# Patient Record
Sex: MALE | Race: BLACK OR AFRICAN AMERICAN | NOT HISPANIC OR LATINO
[De-identification: names, ages, dates, MRNs, and addresses within clinical notes are randomized per-mention and may not be internally consistent; named-entity substitution may affect disease eponyms.]

---

## 2017-01-10 ENCOUNTER — APPOINTMENT (OUTPATIENT)
Dept: OPHTHALMOLOGY | Facility: CLINIC | Age: 62
End: 2017-01-10

## 2017-03-06 ENCOUNTER — APPOINTMENT (OUTPATIENT)
Age: 62
End: 2017-03-06

## 2017-03-06 DIAGNOSIS — H10.31 UNSPECIFIED ACUTE CONJUNCTIVITIS, RIGHT EYE: ICD-10-CM

## 2017-03-06 RX ORDER — OFLOXACIN 3 MG/ML
0.3 SOLUTION/ DROPS OPHTHALMIC 4 TIMES DAILY
Qty: 1 | Refills: 1 | Status: ACTIVE | COMMUNITY
Start: 2017-03-06 | End: 1900-01-01

## 2017-03-07 ENCOUNTER — TRANSCRIPTION ENCOUNTER (OUTPATIENT)
Age: 62
End: 2017-03-07

## 2017-08-18 ENCOUNTER — APPOINTMENT (OUTPATIENT)
Dept: OPHTHALMOLOGY | Facility: CLINIC | Age: 62
End: 2017-08-18
Payer: COMMERCIAL

## 2017-08-18 PROCEDURE — 92014 COMPRE OPH EXAM EST PT 1/>: CPT

## 2017-08-18 PROCEDURE — ZZZZZ: CPT

## 2017-08-30 RX ORDER — PREDNISOLONE ACETATE 10 MG/ML
1 SUSPENSION/ DROPS OPHTHALMIC
Qty: 1 | Refills: 5 | Status: ACTIVE | COMMUNITY
Start: 2017-08-30 | End: 1900-01-01

## 2017-08-30 RX ORDER — OFLOXACIN 3 MG/ML
0.3 SOLUTION/ DROPS OPHTHALMIC 4 TIMES DAILY
Qty: 1 | Refills: 5 | Status: ACTIVE | COMMUNITY
Start: 2017-08-30 | End: 1900-01-01

## 2017-09-18 ENCOUNTER — TRANSCRIPTION ENCOUNTER (OUTPATIENT)
Age: 62
End: 2017-09-18

## 2017-09-18 ENCOUNTER — RESULT REVIEW (OUTPATIENT)
Age: 62
End: 2017-09-18

## 2017-09-18 ENCOUNTER — OUTPATIENT (OUTPATIENT)
Dept: OUTPATIENT SERVICES | Facility: HOSPITAL | Age: 62
LOS: 1 days | End: 2017-09-18
Payer: COMMERCIAL

## 2017-09-18 ENCOUNTER — APPOINTMENT (OUTPATIENT)
Dept: OPHTHALMOLOGY | Facility: HOSPITAL | Age: 62
End: 2017-09-18
Payer: COMMERCIAL

## 2017-09-18 VITALS
HEART RATE: 70 BPM | SYSTOLIC BLOOD PRESSURE: 136 MMHG | DIASTOLIC BLOOD PRESSURE: 87 MMHG | RESPIRATION RATE: 17 BRPM | OXYGEN SATURATION: 98 %

## 2017-09-18 VITALS
RESPIRATION RATE: 20 BRPM | OXYGEN SATURATION: 100 % | SYSTOLIC BLOOD PRESSURE: 134 MMHG | WEIGHT: 295.86 LBS | DIASTOLIC BLOOD PRESSURE: 89 MMHG | HEART RATE: 66 BPM | TEMPERATURE: 99 F | HEIGHT: 69.5 IN

## 2017-09-18 DIAGNOSIS — Z98.890 OTHER SPECIFIED POSTPROCEDURAL STATES: Chronic | ICD-10-CM

## 2017-09-18 DIAGNOSIS — H11.051 PERIPHERAL PTERYGIUM, PROGRESSIVE, RIGHT EYE: ICD-10-CM

## 2017-09-18 PROCEDURE — 65426 REMOVAL OF EYE LESION: CPT | Mod: RT

## 2017-09-18 PROCEDURE — 65426 REMOVAL OF EYE LESION: CPT

## 2017-09-18 PROCEDURE — C1889: CPT

## 2017-09-18 PROCEDURE — V2790: CPT

## 2017-09-18 PROCEDURE — 88304 TISSUE EXAM BY PATHOLOGIST: CPT | Mod: 26

## 2017-09-18 PROCEDURE — 88304 TISSUE EXAM BY PATHOLOGIST: CPT

## 2017-09-18 NOTE — ASU DISCHARGE PLAN (ADULT/PEDIATRIC). - NOTIFY
Fever greater than 101/Pain not relieved by Medications/Persistent Nausea and Vomiting/Bleeding that does not stop/Swelling that continues

## 2017-09-18 NOTE — ASU PATIENT PROFILE, ADULT - PMH
Diabetes    HTN (hypertension)    Hypercholesterolemia    Prostate cancer    Sleep apnea  uses CPAP machine at night

## 2017-09-18 NOTE — ASU DISCHARGE PLAN (ADULT/PEDIATRIC). - SPECIAL INSTRUCTIONS
Remove patch after 3 hours.  Throw away the white pad underneath the clear shield.  Start the following DURING THE DAY ONLY (not at night when sleeping):   Ofloxacin 1 drop every 4 hours,   Ketorolac 1 drop 2 times a day, and   Prednisolone acetate (shake well before using!) 1 drop every 2 hours.    Wear clear shield when napping or sleeping at night.  Do not rub eye as there are no stitches in the eye!  No showering tonight.    Please call [phone] if you have any questions or if you have pain or vomiting despite taking Tylenol (after 4:30 PM-9am).  Please call [phone] or [phone] during the day (9am-4:30PM) if you have any questions or concerns or pain or vomiting despite taking Tylenol.

## 2017-09-19 ENCOUNTER — APPOINTMENT (OUTPATIENT)
Dept: OPHTHALMOLOGY | Facility: CLINIC | Age: 62
End: 2017-09-19
Payer: COMMERCIAL

## 2017-09-19 PROCEDURE — 99024 POSTOP FOLLOW-UP VISIT: CPT

## 2017-09-26 ENCOUNTER — APPOINTMENT (OUTPATIENT)
Dept: OPHTHALMOLOGY | Facility: CLINIC | Age: 62
End: 2017-09-26
Payer: COMMERCIAL

## 2017-09-26 PROCEDURE — 99024 POSTOP FOLLOW-UP VISIT: CPT

## 2017-09-26 PROCEDURE — 92025 CPTRIZED CORNEAL TOPOGRAPHY: CPT

## 2017-10-17 PROBLEM — Z00.00 ENCOUNTER FOR PREVENTIVE HEALTH EXAMINATION: Noted: 2017-10-17

## 2017-10-24 ENCOUNTER — APPOINTMENT (OUTPATIENT)
Dept: OPHTHALMOLOGY | Facility: CLINIC | Age: 62
End: 2017-10-24

## 2018-01-16 ENCOUNTER — APPOINTMENT (OUTPATIENT)
Dept: OPHTHALMOLOGY | Facility: CLINIC | Age: 63
End: 2018-01-16
Payer: COMMERCIAL

## 2018-01-16 PROCEDURE — 92133 CPTRZD OPH DX IMG PST SGM ON: CPT

## 2018-01-16 PROCEDURE — 92012 INTRM OPH EXAM EST PATIENT: CPT

## 2018-01-16 PROCEDURE — 92025 CPTRIZED CORNEAL TOPOGRAPHY: CPT

## 2018-01-16 RX ORDER — PREDNISOLONE ACETATE 10 MG/ML
1 SUSPENSION/ DROPS OPHTHALMIC 4 TIMES DAILY
Qty: 1 | Refills: 5 | Status: ACTIVE | COMMUNITY
Start: 2018-01-16 | End: 1900-01-01

## 2018-01-31 ENCOUNTER — APPOINTMENT (OUTPATIENT)
Dept: OPHTHALMOLOGY | Facility: CLINIC | Age: 63
End: 2018-01-31
Payer: COMMERCIAL

## 2018-01-31 PROCEDURE — 92083 EXTENDED VISUAL FIELD XM: CPT

## 2018-01-31 PROCEDURE — 92012 INTRM OPH EXAM EST PATIENT: CPT

## 2018-03-30 ENCOUNTER — APPOINTMENT (OUTPATIENT)
Dept: OPHTHALMOLOGY | Facility: CLINIC | Age: 63
End: 2018-03-30
Payer: COMMERCIAL

## 2018-03-30 PROCEDURE — 92012 INTRM OPH EXAM EST PATIENT: CPT

## 2018-03-30 PROCEDURE — 92133 CPTRZD OPH DX IMG PST SGM ON: CPT

## 2018-05-25 ENCOUNTER — APPOINTMENT (OUTPATIENT)
Dept: OPHTHALMOLOGY | Facility: CLINIC | Age: 63
End: 2018-05-25
Payer: COMMERCIAL

## 2018-05-25 PROCEDURE — 92012 INTRM OPH EXAM EST PATIENT: CPT

## 2018-05-25 RX ORDER — FLUOROMETHOLONE 1 MG/ML
0.1 SOLUTION/ DROPS OPHTHALMIC 4 TIMES DAILY
Qty: 1 | Refills: 5 | Status: ACTIVE | COMMUNITY
Start: 2018-01-31 | End: 1900-01-01

## 2018-09-07 ENCOUNTER — APPOINTMENT (OUTPATIENT)
Dept: OPHTHALMOLOGY | Facility: CLINIC | Age: 63
End: 2018-09-07
Payer: COMMERCIAL

## 2018-09-07 PROCEDURE — 92133 CPTRZD OPH DX IMG PST SGM ON: CPT

## 2018-09-07 PROCEDURE — 92014 COMPRE OPH EXAM EST PT 1/>: CPT

## 2018-09-07 PROCEDURE — 92025 CPTRIZED CORNEAL TOPOGRAPHY: CPT

## 2018-09-07 RX ORDER — OLOPATADINE HCL 1 MG/ML
0.1 SOLUTION/ DROPS OPHTHALMIC TWICE DAILY
Qty: 1 | Refills: 3 | Status: ACTIVE | COMMUNITY
Start: 2018-09-07 | End: 1900-01-01

## 2019-03-05 ENCOUNTER — APPOINTMENT (OUTPATIENT)
Dept: OPHTHALMOLOGY | Facility: CLINIC | Age: 64
End: 2019-03-05
Payer: COMMERCIAL

## 2019-03-05 DIAGNOSIS — H11.001 UNSPECIFIED PTERYGIUM OF RIGHT EYE: ICD-10-CM

## 2019-03-05 DIAGNOSIS — H40.043 STEROID RESPONDER, BILATERAL: ICD-10-CM

## 2019-03-05 DIAGNOSIS — H25.13 AGE-RELATED NUCLEAR CATARACT, BILATERAL: ICD-10-CM

## 2019-03-05 PROCEDURE — 92136 OPHTHALMIC BIOMETRY: CPT

## 2019-03-05 PROCEDURE — 92012 INTRM OPH EXAM EST PATIENT: CPT

## 2019-03-05 PROCEDURE — 92133 CPTRZD OPH DX IMG PST SGM ON: CPT

## 2019-03-05 RX ORDER — FLUOROMETHOLONE 1 MG/ML
0.1 SOLUTION/ DROPS OPHTHALMIC TWICE DAILY
Qty: 1 | Refills: 5 | Status: ACTIVE | COMMUNITY
Start: 2019-03-05 | End: 1900-01-01

## 2019-03-05 RX ORDER — OLOPATADINE HYDROCHLORIDE 2 MG/ML
0.2 SOLUTION OPHTHALMIC DAILY
Qty: 1 | Refills: 5 | Status: ACTIVE | COMMUNITY
Start: 2019-03-05 | End: 1900-01-01

## 2019-09-09 ENCOUNTER — APPOINTMENT (OUTPATIENT)
Dept: OPHTHALMOLOGY | Facility: CLINIC | Age: 64
End: 2019-09-09
Payer: COMMERCIAL

## 2019-09-09 ENCOUNTER — NON-APPOINTMENT (OUTPATIENT)
Age: 64
End: 2019-09-09

## 2019-09-09 PROCEDURE — 92012 INTRM OPH EXAM EST PATIENT: CPT

## 2019-09-10 ENCOUNTER — APPOINTMENT (OUTPATIENT)
Dept: OPHTHALMOLOGY | Facility: CLINIC | Age: 64
End: 2019-09-10

## 2019-11-02 ENCOUNTER — EMERGENCY (EMERGENCY)
Facility: HOSPITAL | Age: 64
LOS: 0 days | Discharge: HOME | End: 2019-11-02
Payer: COMMERCIAL

## 2019-11-02 VITALS
HEART RATE: 93 BPM | SYSTOLIC BLOOD PRESSURE: 161 MMHG | WEIGHT: 250 LBS | TEMPERATURE: 99 F | OXYGEN SATURATION: 96 % | HEIGHT: 69 IN | RESPIRATION RATE: 16 BRPM | DIASTOLIC BLOOD PRESSURE: 92 MMHG

## 2019-11-02 DIAGNOSIS — Z99.89 DEPENDENCE ON OTHER ENABLING MACHINES AND DEVICES: ICD-10-CM

## 2019-11-02 DIAGNOSIS — Z79.01 LONG TERM (CURRENT) USE OF ANTICOAGULANTS: ICD-10-CM

## 2019-11-02 DIAGNOSIS — K08.89 OTHER SPECIFIED DISORDERS OF TEETH AND SUPPORTING STRUCTURES: ICD-10-CM

## 2019-11-02 DIAGNOSIS — Z79.84 LONG TERM (CURRENT) USE OF ORAL HYPOGLYCEMIC DRUGS: ICD-10-CM

## 2019-11-02 DIAGNOSIS — Z98.890 OTHER SPECIFIED POSTPROCEDURAL STATES: Chronic | ICD-10-CM

## 2019-11-02 DIAGNOSIS — E78.00 PURE HYPERCHOLESTEROLEMIA, UNSPECIFIED: ICD-10-CM

## 2019-11-02 DIAGNOSIS — Z85.46 PERSONAL HISTORY OF MALIGNANT NEOPLASM OF PROSTATE: ICD-10-CM

## 2019-11-02 DIAGNOSIS — G47.30 SLEEP APNEA, UNSPECIFIED: ICD-10-CM

## 2019-11-02 DIAGNOSIS — I10 ESSENTIAL (PRIMARY) HYPERTENSION: ICD-10-CM

## 2019-11-02 DIAGNOSIS — E11.9 TYPE 2 DIABETES MELLITUS WITHOUT COMPLICATIONS: ICD-10-CM

## 2019-11-02 PROCEDURE — 99283 EMERGENCY DEPT VISIT LOW MDM: CPT

## 2019-11-02 RX ORDER — ACETAMINOPHEN 500 MG
2 TABLET ORAL
Qty: 24 | Refills: 0
Start: 2019-11-02 | End: 2019-11-04

## 2019-11-02 RX ORDER — AMOXICILLIN 250 MG/5ML
1 SUSPENSION, RECONSTITUTED, ORAL (ML) ORAL
Qty: 21 | Refills: 0
Start: 2019-11-02 | End: 2019-11-08

## 2019-11-02 RX ORDER — IBUPROFEN 200 MG
1 TABLET ORAL
Qty: 6 | Refills: 0
Start: 2019-11-02 | End: 2019-11-03

## 2019-11-02 RX ORDER — ACETAMINOPHEN 500 MG
975 TABLET ORAL ONCE
Refills: 0 | Status: COMPLETED | OUTPATIENT
Start: 2019-11-02 | End: 2019-11-02

## 2019-11-02 RX ADMIN — Medication 975 MILLIGRAM(S): at 20:41

## 2019-11-02 RX ADMIN — Medication 975 MILLIGRAM(S): at 20:54

## 2019-11-02 RX ADMIN — Medication 1 TABLET(S): at 20:41

## 2019-11-02 NOTE — ED PROVIDER NOTE - OBJECTIVE STATEMENT
63 y/o M with HTN, CAD (denies being on any blood thinners or ASA now, reports his Dr stopped a while back), c/o R upper tooth pain x 3 days. Pt reports that his dentist had said there was a gap on the tooht, but no cavities at the time. pt denies F/C, CP, SOB, dizziness, trouble swallowing. 63 y/o M with HTN, CAD (denies being on any blood thinners or ASA now, reports his Dr stopped a while back), c/o R upper tooth pain x 3 days. Pt reports that his dentist had said there was a "gap" on the tooth, but no cavities at the time. Pt denies F/C, CP, SOB, dizziness, trouble swallowing.

## 2019-11-02 NOTE — ED PROVIDER NOTE - PATIENT PORTAL LINK FT
You can access the FollowMyHealth Patient Portal offered by [facility] by registering at the following website: [email or web address]. By joining [facility]’s FollowMyHealth portal, you will also be able to view your health information using other applications (apps) compatible with our system.

## 2019-11-02 NOTE — ED PROVIDER NOTE - PHYSICAL EXAMINATION
R premolar, subluxed? tooth yellowish. +mild TTP to gingival over the tooth. no abscess, fluctuanca R premolar, subluxed? tooth yellowish. +mild TTP to gingival over the tooth. no swelling/abscess,  no fluctuance. Uvula is midline. talks in full sentences, no resp distress.

## 2019-11-02 NOTE — ED PROVIDER NOTE - CLINICAL SUMMARY MEDICAL DECISION MAKING FREE TEXT BOX
63 y/o M c/o tooth pain. +TTP, no overt abscess. abx, f/u with Dental in 1-2 days. RTER if feel worse or have new symptoms.

## 2020-03-06 ENCOUNTER — APPOINTMENT (OUTPATIENT)
Dept: OPHTHALMOLOGY | Facility: CLINIC | Age: 65
End: 2020-03-06

## 2020-03-09 ENCOUNTER — APPOINTMENT (OUTPATIENT)
Dept: OPHTHALMOLOGY | Facility: CLINIC | Age: 65
End: 2020-03-09

## 2020-09-08 NOTE — ED ADULT NURSE NOTE - CAS EDN INTEG ASSESS
· Suspect hypovolemic hyponatremia in the setting of infection  · Treated with IV fluids  · Repeat BMP in a m  WDL

## 2020-09-10 ENCOUNTER — APPOINTMENT (OUTPATIENT)
Dept: OPHTHALMOLOGY | Facility: CLINIC | Age: 65
End: 2020-09-10
Payer: COMMERCIAL

## 2020-09-10 ENCOUNTER — NON-APPOINTMENT (OUTPATIENT)
Age: 65
End: 2020-09-10

## 2020-09-10 PROCEDURE — 92014 COMPRE OPH EXAM EST PT 1/>: CPT

## 2020-09-10 PROCEDURE — 92134 CPTRZ OPH DX IMG PST SGM RTA: CPT

## 2020-12-09 ENCOUNTER — APPOINTMENT (OUTPATIENT)
Dept: OPHTHALMOLOGY | Facility: CLINIC | Age: 65
End: 2020-12-09
Payer: COMMERCIAL

## 2020-12-09 ENCOUNTER — NON-APPOINTMENT (OUTPATIENT)
Age: 65
End: 2020-12-09

## 2020-12-09 PROCEDURE — 92136 OPHTHALMIC BIOMETRY: CPT

## 2020-12-09 PROCEDURE — 99072 ADDL SUPL MATRL&STAF TM PHE: CPT

## 2020-12-09 PROCEDURE — 92012 INTRM OPH EXAM EST PATIENT: CPT

## 2020-12-09 PROCEDURE — 92025 CPTRIZED CORNEAL TOPOGRAPHY: CPT

## 2021-02-10 ENCOUNTER — APPOINTMENT (OUTPATIENT)
Dept: OPHTHALMOLOGY | Facility: CLINIC | Age: 66
End: 2021-02-10

## 2021-03-11 ENCOUNTER — APPOINTMENT (OUTPATIENT)
Dept: OPHTHALMOLOGY | Facility: CLINIC | Age: 66
End: 2021-03-11
Payer: COMMERCIAL

## 2021-03-11 ENCOUNTER — NON-APPOINTMENT (OUTPATIENT)
Age: 66
End: 2021-03-11

## 2021-03-11 PROCEDURE — 92012 INTRM OPH EXAM EST PATIENT: CPT

## 2021-05-10 ENCOUNTER — TRANSCRIPTION ENCOUNTER (OUTPATIENT)
Age: 66
End: 2021-05-10

## 2021-05-11 ENCOUNTER — APPOINTMENT (OUTPATIENT)
Dept: OPHTHALMOLOGY | Facility: AMBULATORY SURGERY CENTER | Age: 66
End: 2021-05-11
Payer: COMMERCIAL

## 2021-05-11 PROCEDURE — 66984 XCAPSL CTRC RMVL W/O ECP: CPT | Mod: RT

## 2021-05-12 ENCOUNTER — NON-APPOINTMENT (OUTPATIENT)
Age: 66
End: 2021-05-12

## 2021-05-12 ENCOUNTER — APPOINTMENT (OUTPATIENT)
Dept: OPHTHALMOLOGY | Facility: CLINIC | Age: 66
End: 2021-05-12
Payer: COMMERCIAL

## 2021-05-12 PROCEDURE — 99024 POSTOP FOLLOW-UP VISIT: CPT

## 2021-05-19 ENCOUNTER — APPOINTMENT (OUTPATIENT)
Dept: OPHTHALMOLOGY | Facility: CLINIC | Age: 66
End: 2021-05-19
Payer: COMMERCIAL

## 2021-05-19 ENCOUNTER — NON-APPOINTMENT (OUTPATIENT)
Age: 66
End: 2021-05-19

## 2021-05-19 PROCEDURE — 99024 POSTOP FOLLOW-UP VISIT: CPT

## 2021-06-11 ENCOUNTER — APPOINTMENT (OUTPATIENT)
Dept: OPHTHALMOLOGY | Facility: CLINIC | Age: 66
End: 2021-06-11

## 2021-06-21 ENCOUNTER — NON-APPOINTMENT (OUTPATIENT)
Age: 66
End: 2021-06-21

## 2021-06-21 ENCOUNTER — APPOINTMENT (OUTPATIENT)
Dept: OPHTHALMOLOGY | Facility: CLINIC | Age: 66
End: 2021-06-21
Payer: COMMERCIAL

## 2021-06-21 PROCEDURE — 92250 FUNDUS PHOTOGRAPHY W/I&R: CPT

## 2021-06-21 PROCEDURE — 99024 POSTOP FOLLOW-UP VISIT: CPT

## 2021-10-25 ENCOUNTER — APPOINTMENT (OUTPATIENT)
Dept: OPHTHALMOLOGY | Facility: CLINIC | Age: 66
End: 2021-10-25

## 2021-11-03 ENCOUNTER — EMERGENCY (EMERGENCY)
Facility: HOSPITAL | Age: 66
LOS: 0 days | Discharge: HOME | End: 2021-11-04
Attending: STUDENT IN AN ORGANIZED HEALTH CARE EDUCATION/TRAINING PROGRAM
Payer: COMMERCIAL

## 2021-11-03 VITALS
TEMPERATURE: 98 F | HEIGHT: 69 IN | OXYGEN SATURATION: 98 % | DIASTOLIC BLOOD PRESSURE: 100 MMHG | WEIGHT: 248.02 LBS | HEART RATE: 83 BPM | RESPIRATION RATE: 18 BRPM | SYSTOLIC BLOOD PRESSURE: 170 MMHG

## 2021-11-03 DIAGNOSIS — R10.2 PELVIC AND PERINEAL PAIN: ICD-10-CM

## 2021-11-03 DIAGNOSIS — R10.30 LOWER ABDOMINAL PAIN, UNSPECIFIED: ICD-10-CM

## 2021-11-03 DIAGNOSIS — M79.18 MYALGIA, OTHER SITE: ICD-10-CM

## 2021-11-03 DIAGNOSIS — Z98.890 OTHER SPECIFIED POSTPROCEDURAL STATES: Chronic | ICD-10-CM

## 2021-11-03 DIAGNOSIS — Z79.84 LONG TERM (CURRENT) USE OF ORAL HYPOGLYCEMIC DRUGS: ICD-10-CM

## 2021-11-03 DIAGNOSIS — Z98.890 OTHER SPECIFIED POSTPROCEDURAL STATES: ICD-10-CM

## 2021-11-03 DIAGNOSIS — R25.2 CRAMP AND SPASM: ICD-10-CM

## 2021-11-03 DIAGNOSIS — E11.9 TYPE 2 DIABETES MELLITUS WITHOUT COMPLICATIONS: ICD-10-CM

## 2021-11-03 DIAGNOSIS — Z85.46 PERSONAL HISTORY OF MALIGNANT NEOPLASM OF PROSTATE: ICD-10-CM

## 2021-11-03 DIAGNOSIS — I10 ESSENTIAL (PRIMARY) HYPERTENSION: ICD-10-CM

## 2021-11-03 LAB
ALBUMIN SERPL ELPH-MCNC: 3.2 G/DL — LOW (ref 3.3–5)
ALP SERPL-CCNC: 94 U/L — SIGNIFICANT CHANGE UP (ref 40–120)
ALT FLD-CCNC: 17 U/L — SIGNIFICANT CHANGE UP (ref 12–78)
ANION GAP SERPL CALC-SCNC: 6 MMOL/L — SIGNIFICANT CHANGE UP (ref 5–17)
APPEARANCE UR: CLEAR — SIGNIFICANT CHANGE UP
AST SERPL-CCNC: 13 U/L — LOW (ref 15–37)
BASOPHILS # BLD AUTO: 0.06 K/UL — SIGNIFICANT CHANGE UP (ref 0–0.2)
BASOPHILS NFR BLD AUTO: 1 % — SIGNIFICANT CHANGE UP (ref 0–2)
BILIRUB SERPL-MCNC: 0.4 MG/DL — SIGNIFICANT CHANGE UP (ref 0.2–1.2)
BILIRUB UR-MCNC: NEGATIVE — SIGNIFICANT CHANGE UP
BLD GP AB SCN SERPL QL: SIGNIFICANT CHANGE UP
BUN SERPL-MCNC: 17 MG/DL — SIGNIFICANT CHANGE UP (ref 7–23)
CALCIUM SERPL-MCNC: 9.5 MG/DL — SIGNIFICANT CHANGE UP (ref 8.5–10.1)
CHLORIDE SERPL-SCNC: 104 MMOL/L — SIGNIFICANT CHANGE UP (ref 96–108)
CO2 SERPL-SCNC: 26 MMOL/L — SIGNIFICANT CHANGE UP (ref 22–31)
COLOR SPEC: YELLOW — SIGNIFICANT CHANGE UP
CREAT SERPL-MCNC: 0.91 MG/DL — SIGNIFICANT CHANGE UP (ref 0.5–1.3)
DIFF PNL FLD: NEGATIVE — SIGNIFICANT CHANGE UP
EOSINOPHIL # BLD AUTO: 0.13 K/UL — SIGNIFICANT CHANGE UP (ref 0–0.5)
EOSINOPHIL NFR BLD AUTO: 2.1 % — SIGNIFICANT CHANGE UP (ref 0–6)
GLUCOSE SERPL-MCNC: 350 MG/DL — HIGH (ref 70–99)
GLUCOSE UR QL: 1000 MG/DL
HCT VFR BLD CALC: 40.2 % — SIGNIFICANT CHANGE UP (ref 39–50)
HGB BLD-MCNC: 13.4 G/DL — SIGNIFICANT CHANGE UP (ref 13–17)
IMM GRANULOCYTES NFR BLD AUTO: 0.2 % — SIGNIFICANT CHANGE UP (ref 0–1.5)
KETONES UR-MCNC: ABNORMAL
LACTATE SERPL-SCNC: 1.3 MMOL/L — SIGNIFICANT CHANGE UP (ref 0.7–2)
LEUKOCYTE ESTERASE UR-ACNC: NEGATIVE — SIGNIFICANT CHANGE UP
LIDOCAIN IGE QN: 131 U/L — SIGNIFICANT CHANGE UP (ref 73–393)
LYMPHOCYTES # BLD AUTO: 2.84 K/UL — SIGNIFICANT CHANGE UP (ref 1–3.3)
LYMPHOCYTES # BLD AUTO: 45.7 % — HIGH (ref 13–44)
MCHC RBC-ENTMCNC: 30.6 PG — SIGNIFICANT CHANGE UP (ref 27–34)
MCHC RBC-ENTMCNC: 33.3 GM/DL — SIGNIFICANT CHANGE UP (ref 32–36)
MCV RBC AUTO: 91.8 FL — SIGNIFICANT CHANGE UP (ref 80–100)
MONOCYTES # BLD AUTO: 0.53 K/UL — SIGNIFICANT CHANGE UP (ref 0–0.9)
MONOCYTES NFR BLD AUTO: 8.5 % — SIGNIFICANT CHANGE UP (ref 2–14)
NEUTROPHILS # BLD AUTO: 2.64 K/UL — SIGNIFICANT CHANGE UP (ref 1.8–7.4)
NEUTROPHILS NFR BLD AUTO: 42.5 % — LOW (ref 43–77)
NITRITE UR-MCNC: NEGATIVE — SIGNIFICANT CHANGE UP
NRBC # BLD: 0 /100 WBCS — SIGNIFICANT CHANGE UP (ref 0–0)
PH UR: 6 — SIGNIFICANT CHANGE UP (ref 5–8)
PLATELET # BLD AUTO: 278 K/UL — SIGNIFICANT CHANGE UP (ref 150–400)
POTASSIUM SERPL-MCNC: 4.2 MMOL/L — SIGNIFICANT CHANGE UP (ref 3.5–5.3)
POTASSIUM SERPL-SCNC: 4.2 MMOL/L — SIGNIFICANT CHANGE UP (ref 3.5–5.3)
PROT SERPL-MCNC: 7.1 GM/DL — SIGNIFICANT CHANGE UP (ref 6–8.3)
PROT UR-MCNC: NEGATIVE MG/DL — SIGNIFICANT CHANGE UP
RBC # BLD: 4.38 M/UL — SIGNIFICANT CHANGE UP (ref 4.2–5.8)
RBC # FLD: 12.7 % — SIGNIFICANT CHANGE UP (ref 10.3–14.5)
RBC CASTS # UR COMP ASSIST: SIGNIFICANT CHANGE UP /HPF (ref 0–4)
SODIUM SERPL-SCNC: 136 MMOL/L — SIGNIFICANT CHANGE UP (ref 135–145)
SP GR SPEC: 1.01 — SIGNIFICANT CHANGE UP (ref 1.01–1.02)
UROBILINOGEN FLD QL: NEGATIVE MG/DL — SIGNIFICANT CHANGE UP
WBC # BLD: 6.21 K/UL — SIGNIFICANT CHANGE UP (ref 3.8–10.5)
WBC # FLD AUTO: 6.21 K/UL — SIGNIFICANT CHANGE UP (ref 3.8–10.5)

## 2021-11-03 PROCEDURE — 99285 EMERGENCY DEPT VISIT HI MDM: CPT

## 2021-11-03 PROCEDURE — 74177 CT ABD & PELVIS W/CONTRAST: CPT | Mod: 26,MA

## 2021-11-03 PROCEDURE — 93970 EXTREMITY STUDY: CPT | Mod: 26

## 2021-11-03 RX ORDER — SODIUM CHLORIDE 9 MG/ML
1000 INJECTION INTRAMUSCULAR; INTRAVENOUS; SUBCUTANEOUS ONCE
Refills: 0 | Status: COMPLETED | OUTPATIENT
Start: 2021-11-03 | End: 2021-11-03

## 2021-11-03 RX ORDER — IOHEXOL 300 MG/ML
30 INJECTION, SOLUTION INTRAVENOUS ONCE
Refills: 0 | Status: COMPLETED | OUTPATIENT
Start: 2021-11-03 | End: 2021-11-03

## 2021-11-03 RX ORDER — ACETAMINOPHEN 500 MG
975 TABLET ORAL ONCE
Refills: 0 | Status: COMPLETED | OUTPATIENT
Start: 2021-11-03 | End: 2021-11-03

## 2021-11-03 RX ORDER — ACETAMINOPHEN 500 MG
650 TABLET ORAL ONCE
Refills: 0 | Status: COMPLETED | OUTPATIENT
Start: 2021-11-03 | End: 2021-11-03

## 2021-11-03 RX ORDER — KETOROLAC TROMETHAMINE 30 MG/ML
15 SYRINGE (ML) INJECTION ONCE
Refills: 0 | Status: DISCONTINUED | OUTPATIENT
Start: 2021-11-03 | End: 2021-11-03

## 2021-11-03 RX ADMIN — Medication 975 MILLIGRAM(S): at 20:29

## 2021-11-03 RX ADMIN — SODIUM CHLORIDE 1000 MILLILITER(S): 9 INJECTION INTRAMUSCULAR; INTRAVENOUS; SUBCUTANEOUS at 20:29

## 2021-11-03 RX ADMIN — IOHEXOL 30 MILLILITER(S): 300 INJECTION, SOLUTION INTRAVENOUS at 20:29

## 2021-11-03 RX ADMIN — Medication 15 MILLIGRAM(S): at 23:02

## 2021-11-03 RX ADMIN — Medication 15 MILLIGRAM(S): at 23:17

## 2021-11-03 RX ADMIN — Medication 975 MILLIGRAM(S): at 21:29

## 2021-11-03 NOTE — ED PROVIDER NOTE - OBJECTIVE STATEMENT
65 yo M w/PMH of DM, HTN, prostate cancer, stents presents to the ED for lower abdominal pain, b/l flank pain and pelvic pain x2 months. Pt presented to PMD x2 weeks ago who advised pt to stop taking his statin as his pain might be related to his cholesterol and told to present to ED for evaluation is pain persisted. Pt also c/o b/l leg cramping. Denies fever/chills, cough, SOB, CP, abdominal pain, leg swelling, hematuria, dysuria, difficulty urinating or N/V/D. This if pt first hospital visit for this issue. Pt went to cardiologist with negative workup x1 month ago, no imaging done.

## 2021-11-03 NOTE — ED PROVIDER NOTE - NS ED SCRIBE STATEMENT
Patient assisted back to bed via use of STEDY. Incontinent of small amount of stool. Has not yet voided since garrido catheter removed. Bladder scan shows 186mL. Attending

## 2021-11-03 NOTE — ED PROVIDER NOTE - PATIENT PORTAL LINK FT
You can access the FollowMyHealth Patient Portal offered by [facility] by registering at the following website: [email or web address]. By joining [facility]’s FollowMyHealth portal, you will also be able to view your health information using other applications (apps) compatible with our system.

## 2021-11-03 NOTE — ED ADULT NURSE NOTE - OBJECTIVE STATEMENT
Patient A& o x3 came in with complaints of pain in his stomach, sides, and bilateral thighs and back. Patient says the pain started getting worse this week. Last night the patient couldn't rest because of the pain. No N/V/D.  no chest pain, no sob. Pain has been going on for 4 months. Patient saw his PMD and said all tests were normal. Patient stopped taking atorvastatin 2 weeks ago. hx- htn, DM, stents

## 2021-11-03 NOTE — ED ADULT TRIAGE NOTE - HEIGHT IN CM
Patient awake this morning in bed. Patient in good spirits and reports to this nurse that he finally gets to go home today and asked what time he would be able to leave. I explained that we have to call [employer] and determine when transportation can be arranged. Patient says that he hopes to be able to leave soon. I set expectation that he will most likely be here till after lunch time. Medications and assessment completed. Call light and belongings within reach. [phone]

## 2021-11-03 NOTE — ED PROVIDER NOTE - NSICDXPASTMEDICALHX_GEN_ALL_CORE_FT
PAST MEDICAL HISTORY:  Diabetes     HTN (hypertension)     Hypercholesterolemia     Prostate cancer     Sleep apnea uses CPAP machine at night

## 2021-11-03 NOTE — ED ADULT NURSE NOTE - CAS ELECT INFOMATION PROVIDED
DC instructions Mucosal Advancement Flap Text: Given the location of the defect, shape of the defect and the proximity to free margins a mucosal advancement flap was deemed most appropriate. Incisions were made with a 15 blade scalpel in the appropriate fashion along the cutaneous vermilion border and the mucosal lip. The remaining actinically damaged mucosal tissue was excised.  The mucosal advancement flap was then elevated to the gingival sulcus with care taken to preserve the neurovascular structures and advanced into the primary defect. Care was taken to ensure that precise realignment of the vermilion border was achieved.

## 2021-11-03 NOTE — ED PROVIDER NOTE - CLINICAL SUMMARY MEDICAL DECISION MAKING FREE TEXT BOX
Pt with x1 month of abdominal pain. Given age and lack of recent workup will obtain CT scan and blood work. Will also r/o DVT given lower extremity cramping.

## 2021-11-03 NOTE — ED ADULT TRIAGE NOTE - CHIEF COMPLAINT QUOTE
c/o abdominal pain and b/l leg pain x 2 months seen at pmd for same c/o labs done no abnormal findings per pt atorvastatin d/c'd but pain persists

## 2021-11-04 VITALS
OXYGEN SATURATION: 97 % | HEART RATE: 77 BPM | SYSTOLIC BLOOD PRESSURE: 146 MMHG | DIASTOLIC BLOOD PRESSURE: 91 MMHG | RESPIRATION RATE: 18 BRPM | TEMPERATURE: 98 F

## 2021-11-05 LAB
CULTURE RESULTS: NO GROWTH — SIGNIFICANT CHANGE UP
SPECIMEN SOURCE: SIGNIFICANT CHANGE UP

## 2021-11-08 ENCOUNTER — APPOINTMENT (OUTPATIENT)
Dept: OPHTHALMOLOGY | Facility: CLINIC | Age: 66
End: 2021-11-08
Payer: COMMERCIAL

## 2021-11-08 ENCOUNTER — NON-APPOINTMENT (OUTPATIENT)
Age: 66
End: 2021-11-08

## 2021-11-08 PROCEDURE — 92250 FUNDUS PHOTOGRAPHY W/I&R: CPT

## 2021-11-08 PROCEDURE — 92012 INTRM OPH EXAM EST PATIENT: CPT

## 2021-12-31 ENCOUNTER — EMERGENCY (EMERGENCY)
Facility: HOSPITAL | Age: 66
LOS: 0 days | Discharge: ANOTHER HOSPITAL | End: 2021-12-31
Attending: EMERGENCY MEDICINE
Payer: COMMERCIAL

## 2021-12-31 ENCOUNTER — INPATIENT (INPATIENT)
Facility: HOSPITAL | Age: 66
LOS: 2 days | Discharge: HOME | DRG: 246 | End: 2022-01-03
Attending: HOSPITALIST | Admitting: INTERNAL MEDICINE
Payer: COMMERCIAL

## 2021-12-31 VITALS — DIASTOLIC BLOOD PRESSURE: 83 MMHG | HEART RATE: 80 BPM | SYSTOLIC BLOOD PRESSURE: 115 MMHG

## 2021-12-31 VITALS
SYSTOLIC BLOOD PRESSURE: 137 MMHG | HEART RATE: 99 BPM | OXYGEN SATURATION: 100 % | DIASTOLIC BLOOD PRESSURE: 90 MMHG | RESPIRATION RATE: 18 BRPM | TEMPERATURE: 98 F

## 2021-12-31 VITALS
HEART RATE: 102 BPM | SYSTOLIC BLOOD PRESSURE: 136 MMHG | HEIGHT: 69 IN | OXYGEN SATURATION: 100 % | WEIGHT: 237 LBS | DIASTOLIC BLOOD PRESSURE: 87 MMHG | TEMPERATURE: 98 F

## 2021-12-31 DIAGNOSIS — E11.9 TYPE 2 DIABETES MELLITUS WITHOUT COMPLICATIONS: ICD-10-CM

## 2021-12-31 DIAGNOSIS — E78.00 PURE HYPERCHOLESTEROLEMIA, UNSPECIFIED: ICD-10-CM

## 2021-12-31 DIAGNOSIS — C61 MALIGNANT NEOPLASM OF PROSTATE: ICD-10-CM

## 2021-12-31 DIAGNOSIS — I21.3 ST ELEVATION (STEMI) MYOCARDIAL INFARCTION OF UNSPECIFIED SITE: ICD-10-CM

## 2021-12-31 DIAGNOSIS — Z98.890 OTHER SPECIFIED POSTPROCEDURAL STATES: Chronic | ICD-10-CM

## 2021-12-31 DIAGNOSIS — I10 ESSENTIAL (PRIMARY) HYPERTENSION: ICD-10-CM

## 2021-12-31 DIAGNOSIS — R07.89 OTHER CHEST PAIN: ICD-10-CM

## 2021-12-31 DIAGNOSIS — Z79.84 LONG TERM (CURRENT) USE OF ORAL HYPOGLYCEMIC DRUGS: ICD-10-CM

## 2021-12-31 LAB
ALBUMIN SERPL ELPH-MCNC: 3.6 G/DL — SIGNIFICANT CHANGE UP (ref 3.3–5)
ALBUMIN SERPL ELPH-MCNC: 4.1 G/DL — SIGNIFICANT CHANGE UP (ref 3.3–5)
ALP SERPL-CCNC: 89 U/L — SIGNIFICANT CHANGE UP (ref 40–120)
ALP SERPL-CCNC: 98 U/L — SIGNIFICANT CHANGE UP (ref 40–120)
ALT FLD-CCNC: 11 U/L — SIGNIFICANT CHANGE UP (ref 10–45)
ALT FLD-CCNC: 18 U/L — SIGNIFICANT CHANGE UP (ref 12–78)
ANION GAP SERPL CALC-SCNC: 14 MMOL/L — SIGNIFICANT CHANGE UP (ref 5–17)
ANION GAP SERPL CALC-SCNC: 8 MMOL/L — SIGNIFICANT CHANGE UP (ref 5–17)
APTT BLD: 37 SEC — HIGH (ref 27.5–35.5)
APTT BLD: 49 SEC — HIGH (ref 27.5–35.5)
AST SERPL-CCNC: 16 U/L — SIGNIFICANT CHANGE UP (ref 15–37)
AST SERPL-CCNC: 32 U/L — SIGNIFICANT CHANGE UP (ref 10–40)
BASOPHILS # BLD AUTO: 0.03 K/UL — SIGNIFICANT CHANGE UP (ref 0–0.2)
BASOPHILS # BLD AUTO: 0.04 K/UL — SIGNIFICANT CHANGE UP (ref 0–0.2)
BASOPHILS NFR BLD AUTO: 0.4 % — SIGNIFICANT CHANGE UP (ref 0–2)
BASOPHILS NFR BLD AUTO: 0.5 % — SIGNIFICANT CHANGE UP (ref 0–2)
BILIRUB SERPL-MCNC: 0.4 MG/DL — SIGNIFICANT CHANGE UP (ref 0.2–1.2)
BILIRUB SERPL-MCNC: 0.7 MG/DL — SIGNIFICANT CHANGE UP (ref 0.2–1.2)
BLD GP AB SCN SERPL QL: NEGATIVE — SIGNIFICANT CHANGE UP
BUN SERPL-MCNC: 10 MG/DL — SIGNIFICANT CHANGE UP (ref 7–23)
BUN SERPL-MCNC: 13 MG/DL — SIGNIFICANT CHANGE UP (ref 7–23)
CALCIUM SERPL-MCNC: 9.2 MG/DL — SIGNIFICANT CHANGE UP (ref 8.5–10.1)
CALCIUM SERPL-MCNC: 9.5 MG/DL — SIGNIFICANT CHANGE UP (ref 8.4–10.5)
CHLORIDE SERPL-SCNC: 103 MMOL/L — SIGNIFICANT CHANGE UP (ref 96–108)
CHLORIDE SERPL-SCNC: 99 MMOL/L — SIGNIFICANT CHANGE UP (ref 96–108)
CK MB CFR SERPL CALC: 38.1 NG/ML — HIGH (ref 0–6.7)
CK SERPL-CCNC: 412 U/L — HIGH (ref 30–200)
CO2 SERPL-SCNC: 19 MMOL/L — LOW (ref 22–31)
CO2 SERPL-SCNC: 23 MMOL/L — SIGNIFICANT CHANGE UP (ref 22–31)
CREAT SERPL-MCNC: 0.58 MG/DL — SIGNIFICANT CHANGE UP (ref 0.5–1.3)
CREAT SERPL-MCNC: 1.02 MG/DL — SIGNIFICANT CHANGE UP (ref 0.5–1.3)
EOSINOPHIL # BLD AUTO: 0.01 K/UL — SIGNIFICANT CHANGE UP (ref 0–0.5)
EOSINOPHIL # BLD AUTO: 0.02 K/UL — SIGNIFICANT CHANGE UP (ref 0–0.5)
EOSINOPHIL NFR BLD AUTO: 0.1 % — SIGNIFICANT CHANGE UP (ref 0–6)
EOSINOPHIL NFR BLD AUTO: 0.3 % — SIGNIFICANT CHANGE UP (ref 0–6)
FLUAV AG NPH QL: SIGNIFICANT CHANGE UP
FLUBV AG NPH QL: SIGNIFICANT CHANGE UP
GLUCOSE BLDC GLUCOMTR-MCNC: 243 MG/DL — HIGH (ref 70–99)
GLUCOSE BLDC GLUCOMTR-MCNC: 305 MG/DL — HIGH (ref 70–99)
GLUCOSE SERPL-MCNC: 227 MG/DL — HIGH (ref 70–99)
GLUCOSE SERPL-MCNC: 290 MG/DL — HIGH (ref 70–99)
HCT VFR BLD CALC: 43.1 % — SIGNIFICANT CHANGE UP (ref 39–50)
HCT VFR BLD CALC: 46.8 % — SIGNIFICANT CHANGE UP (ref 39–50)
HGB BLD-MCNC: 14.3 G/DL — SIGNIFICANT CHANGE UP (ref 13–17)
HGB BLD-MCNC: 15.5 G/DL — SIGNIFICANT CHANGE UP (ref 13–17)
IMM GRANULOCYTES NFR BLD AUTO: 0.1 % — SIGNIFICANT CHANGE UP (ref 0–1.5)
IMM GRANULOCYTES NFR BLD AUTO: 0.1 % — SIGNIFICANT CHANGE UP (ref 0–1.5)
INR BLD: 1.02 RATIO — SIGNIFICANT CHANGE UP (ref 0.88–1.16)
INR BLD: 1.07 RATIO — SIGNIFICANT CHANGE UP (ref 0.88–1.16)
LACTATE SERPL-SCNC: 2.9 MMOL/L — HIGH (ref 0.7–2)
LYMPHOCYTES # BLD AUTO: 2.29 K/UL — SIGNIFICANT CHANGE UP (ref 1–3.3)
LYMPHOCYTES # BLD AUTO: 2.31 K/UL — SIGNIFICANT CHANGE UP (ref 1–3.3)
LYMPHOCYTES # BLD AUTO: 28.8 % — SIGNIFICANT CHANGE UP (ref 13–44)
LYMPHOCYTES # BLD AUTO: 30.5 % — SIGNIFICANT CHANGE UP (ref 13–44)
MAGNESIUM SERPL-MCNC: 1.9 MG/DL — SIGNIFICANT CHANGE UP (ref 1.6–2.6)
MAGNESIUM SERPL-MCNC: 2.1 MG/DL — SIGNIFICANT CHANGE UP (ref 1.6–2.6)
MCHC RBC-ENTMCNC: 30.2 PG — SIGNIFICANT CHANGE UP (ref 27–34)
MCHC RBC-ENTMCNC: 30.8 PG — SIGNIFICANT CHANGE UP (ref 27–34)
MCHC RBC-ENTMCNC: 33.1 GM/DL — SIGNIFICANT CHANGE UP (ref 32–36)
MCHC RBC-ENTMCNC: 33.2 GM/DL — SIGNIFICANT CHANGE UP (ref 32–36)
MCV RBC AUTO: 91.2 FL — SIGNIFICANT CHANGE UP (ref 80–100)
MCV RBC AUTO: 92.9 FL — SIGNIFICANT CHANGE UP (ref 80–100)
MONOCYTES # BLD AUTO: 0.56 K/UL — SIGNIFICANT CHANGE UP (ref 0–0.9)
MONOCYTES # BLD AUTO: 0.63 K/UL — SIGNIFICANT CHANGE UP (ref 0–0.9)
MONOCYTES NFR BLD AUTO: 7 % — SIGNIFICANT CHANGE UP (ref 2–14)
MONOCYTES NFR BLD AUTO: 8.3 % — SIGNIFICANT CHANGE UP (ref 2–14)
NEUTROPHILS # BLD AUTO: 4.57 K/UL — SIGNIFICANT CHANGE UP (ref 1.8–7.4)
NEUTROPHILS # BLD AUTO: 5.04 K/UL — SIGNIFICANT CHANGE UP (ref 1.8–7.4)
NEUTROPHILS NFR BLD AUTO: 60.5 % — SIGNIFICANT CHANGE UP (ref 43–77)
NEUTROPHILS NFR BLD AUTO: 63.4 % — SIGNIFICANT CHANGE UP (ref 43–77)
NRBC # BLD: 0 /100 WBCS — SIGNIFICANT CHANGE UP (ref 0–0)
NRBC # BLD: 0 /100 WBCS — SIGNIFICANT CHANGE UP (ref 0–0)
NT-PROBNP SERPL-SCNC: 114 PG/ML — SIGNIFICANT CHANGE UP (ref 0–125)
NT-PROBNP SERPL-SCNC: 258 PG/ML — SIGNIFICANT CHANGE UP (ref 0–300)
PHOSPHATE SERPL-MCNC: 3.1 MG/DL — SIGNIFICANT CHANGE UP (ref 2.5–4.5)
PLATELET # BLD AUTO: 315 K/UL — SIGNIFICANT CHANGE UP (ref 150–400)
PLATELET # BLD AUTO: 319 K/UL — SIGNIFICANT CHANGE UP (ref 150–400)
POTASSIUM SERPL-MCNC: 4.1 MMOL/L — SIGNIFICANT CHANGE UP (ref 3.5–5.3)
POTASSIUM SERPL-MCNC: 4.7 MMOL/L — SIGNIFICANT CHANGE UP (ref 3.5–5.3)
POTASSIUM SERPL-SCNC: 4.1 MMOL/L — SIGNIFICANT CHANGE UP (ref 3.5–5.3)
POTASSIUM SERPL-SCNC: 4.7 MMOL/L — SIGNIFICANT CHANGE UP (ref 3.5–5.3)
PROT SERPL-MCNC: 6.9 G/DL — SIGNIFICANT CHANGE UP (ref 6–8.3)
PROT SERPL-MCNC: 8 GM/DL — SIGNIFICANT CHANGE UP (ref 6–8.3)
PROTHROM AB SERPL-ACNC: 12.2 SEC — SIGNIFICANT CHANGE UP (ref 10.6–13.6)
PROTHROM AB SERPL-ACNC: 12.4 SEC — SIGNIFICANT CHANGE UP (ref 10.6–13.6)
RBC # BLD: 4.64 M/UL — SIGNIFICANT CHANGE UP (ref 4.2–5.8)
RBC # BLD: 5.13 M/UL — SIGNIFICANT CHANGE UP (ref 4.2–5.8)
RBC # FLD: 13 % — SIGNIFICANT CHANGE UP (ref 10.3–14.5)
RBC # FLD: 13.1 % — SIGNIFICANT CHANGE UP (ref 10.3–14.5)
RH IG SCN BLD-IMP: POSITIVE — SIGNIFICANT CHANGE UP
SARS-COV-2 RNA SPEC QL NAA+PROBE: SIGNIFICANT CHANGE UP
SODIUM SERPL-SCNC: 132 MMOL/L — LOW (ref 135–145)
SODIUM SERPL-SCNC: 134 MMOL/L — LOW (ref 135–145)
TROPONIN I, HIGH SENSITIVITY RESULT: 124.9 NG/L — HIGH
TROPONIN T, HIGH SENSITIVITY RESULT: 739 NG/L — HIGH (ref 0–51)
WBC # BLD: 7.57 K/UL — SIGNIFICANT CHANGE UP (ref 3.8–10.5)
WBC # BLD: 7.95 K/UL — SIGNIFICANT CHANGE UP (ref 3.8–10.5)
WBC # FLD AUTO: 7.57 K/UL — SIGNIFICANT CHANGE UP (ref 3.8–10.5)
WBC # FLD AUTO: 7.95 K/UL — SIGNIFICANT CHANGE UP (ref 3.8–10.5)

## 2021-12-31 PROCEDURE — 92941 PRQ TRLML REVSC TOT OCCL AMI: CPT | Mod: RC

## 2021-12-31 PROCEDURE — 92978 ENDOLUMINL IVUS OCT C 1ST: CPT | Mod: 26,RC

## 2021-12-31 PROCEDURE — 93454 CORONARY ARTERY ANGIO S&I: CPT | Mod: 26,59

## 2021-12-31 PROCEDURE — 99291 CRITICAL CARE FIRST HOUR: CPT | Mod: 25

## 2021-12-31 PROCEDURE — 93010 ELECTROCARDIOGRAM REPORT: CPT

## 2021-12-31 PROCEDURE — 99152 MOD SED SAME PHYS/QHP 5/>YRS: CPT

## 2021-12-31 RX ORDER — SOLIFENACIN SUCCINATE 10 MG/1
1 TABLET ORAL
Qty: 0 | Refills: 0 | DISCHARGE

## 2021-12-31 RX ORDER — FUROSEMIDE 40 MG
1 TABLET ORAL
Qty: 0 | Refills: 0 | DISCHARGE

## 2021-12-31 RX ORDER — METFORMIN HYDROCHLORIDE 850 MG/1
1 TABLET ORAL
Qty: 0 | Refills: 0 | DISCHARGE

## 2021-12-31 RX ORDER — TICAGRELOR 90 MG/1
90 TABLET ORAL EVERY 12 HOURS
Refills: 0 | Status: DISCONTINUED | OUTPATIENT
Start: 2021-12-31 | End: 2021-12-31

## 2021-12-31 RX ORDER — TICAGRELOR 90 MG/1
180 TABLET ORAL ONCE
Refills: 0 | Status: COMPLETED | OUTPATIENT
Start: 2021-12-31 | End: 2021-12-31

## 2021-12-31 RX ORDER — HEPARIN SODIUM 5000 [USP'U]/ML
INJECTION INTRAVENOUS; SUBCUTANEOUS
Qty: 25000 | Refills: 0 | Status: DISCONTINUED | OUTPATIENT
Start: 2021-12-31 | End: 2021-12-31

## 2021-12-31 RX ORDER — HEPARIN SODIUM 5000 [USP'U]/ML
4000 INJECTION INTRAVENOUS; SUBCUTANEOUS ONCE
Refills: 0 | Status: COMPLETED | OUTPATIENT
Start: 2021-12-31 | End: 2021-12-31

## 2021-12-31 RX ORDER — DEXTROSE 50 % IN WATER 50 %
25 SYRINGE (ML) INTRAVENOUS ONCE
Refills: 0 | Status: DISCONTINUED | OUTPATIENT
Start: 2021-12-31 | End: 2022-01-03

## 2021-12-31 RX ORDER — INSULIN LISPRO 100/ML
VIAL (ML) SUBCUTANEOUS AT BEDTIME
Refills: 0 | Status: DISCONTINUED | OUTPATIENT
Start: 2021-12-31 | End: 2022-01-03

## 2021-12-31 RX ORDER — DEXTROSE 50 % IN WATER 50 %
12.5 SYRINGE (ML) INTRAVENOUS ONCE
Refills: 0 | Status: DISCONTINUED | OUTPATIENT
Start: 2021-12-31 | End: 2022-01-03

## 2021-12-31 RX ORDER — ASPIRIN/CALCIUM CARB/MAGNESIUM 324 MG
325 TABLET ORAL ONCE
Refills: 0 | Status: COMPLETED | OUTPATIENT
Start: 2021-12-31 | End: 2021-12-31

## 2021-12-31 RX ORDER — HEPARIN SODIUM 5000 [USP'U]/ML
4000 INJECTION INTRAVENOUS; SUBCUTANEOUS EVERY 6 HOURS
Refills: 0 | Status: DISCONTINUED | OUTPATIENT
Start: 2021-12-31 | End: 2021-12-31

## 2021-12-31 RX ORDER — INSULIN LISPRO 100/ML
VIAL (ML) SUBCUTANEOUS
Refills: 0 | Status: DISCONTINUED | OUTPATIENT
Start: 2021-12-31 | End: 2022-01-02

## 2021-12-31 RX ORDER — ASPIRIN/CALCIUM CARB/MAGNESIUM 324 MG
81 TABLET ORAL DAILY
Refills: 0 | Status: DISCONTINUED | OUTPATIENT
Start: 2021-12-31 | End: 2021-12-31

## 2021-12-31 RX ORDER — SODIUM CHLORIDE 9 MG/ML
1000 INJECTION, SOLUTION INTRAVENOUS
Refills: 0 | Status: DISCONTINUED | OUTPATIENT
Start: 2021-12-31 | End: 2022-01-01

## 2021-12-31 RX ORDER — ATORVASTATIN CALCIUM 80 MG/1
80 TABLET, FILM COATED ORAL ONCE
Refills: 0 | Status: COMPLETED | OUTPATIENT
Start: 2021-12-31 | End: 2021-12-31

## 2021-12-31 RX ORDER — CLOMIPRAMINE HYDROCHLORIDE 50 MG/1
25 CAPSULE ORAL DAILY
Refills: 0 | Status: DISCONTINUED | OUTPATIENT
Start: 2021-12-31 | End: 2021-12-31

## 2021-12-31 RX ORDER — ATORVASTATIN CALCIUM 80 MG/1
80 TABLET, FILM COATED ORAL AT BEDTIME
Refills: 0 | Status: DISCONTINUED | OUTPATIENT
Start: 2021-12-31 | End: 2021-12-31

## 2021-12-31 RX ORDER — MAGNESIUM SULFATE 500 MG/ML
2 VIAL (ML) INJECTION ONCE
Refills: 0 | Status: COMPLETED | OUTPATIENT
Start: 2021-12-31 | End: 2021-12-31

## 2021-12-31 RX ORDER — DEXTROSE 50 % IN WATER 50 %
15 SYRINGE (ML) INTRAVENOUS ONCE
Refills: 0 | Status: DISCONTINUED | OUTPATIENT
Start: 2021-12-31 | End: 2022-01-03

## 2021-12-31 RX ORDER — GLUCAGON INJECTION, SOLUTION 0.5 MG/.1ML
1 INJECTION, SOLUTION SUBCUTANEOUS ONCE
Refills: 0 | Status: DISCONTINUED | OUTPATIENT
Start: 2021-12-31 | End: 2022-01-01

## 2021-12-31 RX ORDER — CHLORHEXIDINE GLUCONATE 213 G/1000ML
1 SOLUTION TOPICAL DAILY
Refills: 0 | Status: DISCONTINUED | OUTPATIENT
Start: 2021-12-31 | End: 2022-01-03

## 2021-12-31 RX ORDER — CLOMIPRAMINE HYDROCHLORIDE 50 MG/1
0 CAPSULE ORAL
Qty: 0 | Refills: 0 | DISCHARGE

## 2021-12-31 RX ORDER — OXYBUTYNIN CHLORIDE 5 MG
5 TABLET ORAL
Refills: 0 | Status: DISCONTINUED | OUTPATIENT
Start: 2021-12-31 | End: 2022-01-03

## 2021-12-31 RX ORDER — HEPARIN SODIUM 5000 [USP'U]/ML
5000 INJECTION INTRAVENOUS; SUBCUTANEOUS EVERY 8 HOURS
Refills: 0 | Status: DISCONTINUED | OUTPATIENT
Start: 2021-12-31 | End: 2022-01-03

## 2021-12-31 RX ORDER — ASPIRIN/CALCIUM CARB/MAGNESIUM 324 MG
81 TABLET ORAL DAILY
Refills: 0 | Status: DISCONTINUED | OUTPATIENT
Start: 2022-01-01 | End: 2022-01-03

## 2021-12-31 RX ORDER — TICAGRELOR 90 MG/1
90 TABLET ORAL EVERY 12 HOURS
Refills: 0 | Status: DISCONTINUED | OUTPATIENT
Start: 2021-12-31 | End: 2022-01-03

## 2021-12-31 RX ORDER — ATORVASTATIN CALCIUM 80 MG/1
80 TABLET, FILM COATED ORAL AT BEDTIME
Refills: 0 | Status: DISCONTINUED | OUTPATIENT
Start: 2022-01-01 | End: 2022-01-03

## 2021-12-31 RX ADMIN — ATORVASTATIN CALCIUM 80 MILLIGRAM(S): 80 TABLET, FILM COATED ORAL at 12:12

## 2021-12-31 RX ADMIN — HEPARIN SODIUM 4000 UNIT(S): 5000 INJECTION INTRAVENOUS; SUBCUTANEOUS at 12:28

## 2021-12-31 RX ADMIN — Medication 325 MILLIGRAM(S): at 12:12

## 2021-12-31 RX ADMIN — TICAGRELOR 180 MILLIGRAM(S): 90 TABLET ORAL at 12:13

## 2021-12-31 RX ADMIN — Medication 5 MILLIGRAM(S): at 18:01

## 2021-12-31 RX ADMIN — Medication 25 GRAM(S): at 20:00

## 2021-12-31 RX ADMIN — HEPARIN SODIUM 4000 UNIT(S): 5000 INJECTION INTRAVENOUS; SUBCUTANEOUS at 12:31

## 2021-12-31 RX ADMIN — TICAGRELOR 90 MILLIGRAM(S): 90 TABLET ORAL at 23:05

## 2021-12-31 RX ADMIN — HEPARIN SODIUM 5000 UNIT(S): 5000 INJECTION INTRAVENOUS; SUBCUTANEOUS at 23:04

## 2021-12-31 RX ADMIN — Medication 2: at 16:57

## 2021-12-31 RX ADMIN — Medication 2: at 23:04

## 2021-12-31 NOTE — ED ADULT TRIAGE NOTE - CHIEF COMPLAINT QUOTE
pt a&o x4 pt c.o of chest pain  x 2 days. pain central sternal 7/10. no meds prior to arrival. PMh HTN , DM

## 2021-12-31 NOTE — PATIENT PROFILE ADULT - FUNCTIONAL ASSESSMENT - DAILY ACTIVITY 2.
7/21/20   Pt has been notified of results and recommendations. She did not know about the chronic lung disease. Ct scan results faxed to Dr Sarah Camacho for her follow up appt. She would like to try this medication you put her on for a few weeks. If it does not work then she will cb to sched w/ Dr Estefania Ruiz. 4 = No assist / stand by assistance

## 2021-12-31 NOTE — PROGRESS NOTE ADULT - SUBJECTIVE AND OBJECTIVE BOX
JOE GONZALEZ  MRN-[id number]  Patient is a 66y old  Male who presents with a chief complaint of STEMI (31 Dec 2021 14:18)    HPI:  66M history of HTN, DM, prostate ca, reported PCI in past (?) who presented to  with CP since yesterday. Pt states CP woke him up from sleep yesterday morning at 5AM, associated with R arm discomfort. No diaphoresis or SOB or n/v. CP became progressive worse from 6/10- 8/10 so he presented ot the ED. ECG showing SUJIT in inferior leads. VS at ED: T 98, -99, /87, satting well on RA. EKG c/w STEMI with NSR, rate 99, LAE, SUJIT inferior leads, ST depression aVL, narrow QRS. [facility] called for transfer for emergent cath. Patient accepted for transfer to cath lab. Loaded with ASA+Brilinta, Atorvastatin, and heparin bolus at . Patient with 3/10 CP on arrival here. CE still pending. No fever, chills, SOB, cough, abd pain, n/v, diarrhea. COVID vax x3, booster about 1 wk ago.    Pt states he had 2 stents placed 5 years ago at [facility]? However chart reviewed, could not find any cath report or documentation about this. He states he was told several years ago by his cardiologist he no longer needed to be on blood thinners for his stent.    Pt went for cath 12/31 with findings of 100% occlusion of RCA s/p DESx3, intracoronary integrelin 19mg given. LAD 80% occluded. Also given 1 of versed, 25 of fentanyl and 6000U heparin bolus. LVEDP 8-12, ventriculogram EF 40%, given 500cc bolus for hypotension and improved. Planned for proximal LAD on Monday . Post procedure, CP improved to 1/10.  Pt seen and evaluated in CICU. Denying any CP or SOB.  TropI 124 from . COVID-19 PCR negative. (31 Dec 2021 14:18)    Hospital Course:  12/31 Transferred to CICU     24 HOUR EVENTS:    REVIEW OF SYSTEMS:  CONSTITUTIONAL: No weakness, fevers or chills  EYES/ENT: No visual changes;  No vertigo or throat pain   NECK: No pain or stiffness  RESPIRATORY: No cough, wheezing, hemoptysis; No shortness of breath  CARDIOVASCULAR: No chest pain or palpitations  GASTROINTESTINAL: No abdominal or epigastric pain. No nausea, vomiting, or hematemesis; No diarrhea or constipation. No melena or hematochezia.  GENITOURINARY: No dysuria, frequency or hematuria  NEUROLOGICAL: No numbness or weakness  SKIN: No itching, burning, rashes, or lesions   HEME: no easy bruising or unexplained bleeding  ENDO: no heat intolerance, no cold intolerance  PSYCH: no SI, or depression  All other review of systems is negative unless indicated above.    ICU Vital Signs Last 24 Hrs  T(C): 36.3 (31 Dec 2021 19:00), Max: 37.1 (31 Dec 2021 14:30)  T(F): 97.3 (31 Dec 2021 19:00), Max: 98.7 (31 Dec 2021 14:30)  HR: 79 (31 Dec 2021 19:00) (73 - 102)  BP: 107/64 (31 Dec 2021 19:00) (107/64 - 138/77)  BP(mean): 80 (31 Dec 2021 19:00) (80 - 102)  ABP: --  ABP(mean): --  RR: 21 (31 Dec 2021 19:00) (17 - 24)  SpO2: 94% (31 Dec 2021 19:00) (93% - 100%)      CVP(mm Hg): --  CO: --  CI: --  PA: --  PA(mean): --  PA(direct): --  PCWP: --  LA: --  RA: --  SVR: --  SVRI: --  PVR: --  PVRI: --  I&O's Summary    31 Dec 2021 07:01  -  31 Dec 2021 20:12  --------------------------------------------------------  IN: 360 mL / OUT: 500 mL / NET: -140 mL        CAPILLARY BLOOD GLUCOSE    CAPILLARY BLOOD GLUCOSE      POCT Blood Glucose.: 243 mg/dL (31 Dec 2021 16:27)      PHYSICAL EXAM:  GENERAL: NAD, lying in bed comfortably  HEAD:  Atraumatic, Normocephalic  EYES: EOMI, PERRLA, conjunctiva and sclera clear  ENT: Moist mucous membranes  NECK: Supple, No JVD  CHEST/LUNG: Clear to auscultation bilaterally; No rales, rhonchi, wheezing, or rubs. Unlabored respirations  HEART: Regular rate and rhythm; No murmurs, rubs, or gallops  ABDOMEN: BSx4; Soft, nontender, nondistended  EXTREMITIES:  2+ Peripheral Pulses, brisk capillary refill. No clubbing, cyanosis, or edema  +R radial band  NERVOUS SYSTEM:  A&Ox3, no focal deficits   SKIN: No rashes or lesions  Psych: Normal speech, normal behavior, normal affect    ============================I/O===========================   I&O's Detail    31 Dec 2021 07:01  -  31 Dec 2021 20:12  --------------------------------------------------------  IN:    Oral Fluid: 360 mL  Total IN: 360 mL    OUT:    Voided (mL): 500 mL  Total OUT: 500 mL    Total NET: -140 mL        ============================ LABS =========================                        14.3   7.95  )-----------( 319      ( 31 Dec 2021 18:01 )             43.1     12-31    132<L>  |  99  |  10  ----------------------------<  227<H>  4.1   |  19<L>  |  0.58    Ca    9.5      31 Dec 2021 18:01  Phos  3.1     12-31  Mg     1.9     12-31    TPro  6.9  /  Alb  4.1  /  TBili  0.4  /  DBili  x   /  AST  32  /  ALT  11  /  AlkPhos  89  12-31    Troponin T, High Sensitivity Result: 739 ng/L (12-31-21 @ 18:01)    CKMB Units: 38.1 ng/mL (12-31-21 @ 18:01)    Creatine Kinase, Serum: 412 U/L (12-31-21 @ 18:01)      LIVER FUNCTIONS - ( 31 Dec 2021 18:01 )  Alb: 4.1 g/dL / Pro: 6.9 g/dL / ALK PHOS: 89 U/L / ALT: 11 U/L / AST: 32 U/L / GGT: x           PT/INR - ( 31 Dec 2021 17:57 )   PT: 12.2 sec;   INR: 1.02 ratio         PTT - ( 31 Dec 2021 17:57 )  PTT:49.0 sec    Lactate, Blood: 2.9 mmol/L (12-31-21 @ 18:01)      ======================Micro/Rad/Cardio=================  Telemtry: Reviewed   EKG: Reviewed  CXR: Reviewed  Culture: Reviewed   Echo:   Cath:   ======================================================  PAST MEDICAL & SURGICAL HISTORY:  HTN (hypertension)    Diabetes    Hypercholesterolemia    Sleep apnea  uses CPAP machine at night    Prostate cancer    History of pterygium excision  left eye      ====================ASSESSMENT ==============  66M history of HTN, DM, prostate ca, SHERRY (qhs CPAP) reported PCI in past (?) who presented to  with CP since yesterday, found to have IWSTEMI, load/brillinta txfer to [facility] for cath (12/31),  s/p 100% occlusion of RCA s/p DESx3, LAD 80%, plan for staging to LAD on Monday.    Plan:  ====================== NEUROLOGY=====================  -no active issue  Continue close monitoring of neuro status     ==================== RESPIRATORY======================  -on RA   -hx of SHERRY on CPAP, c/w home CPAP  - Encourage incentive spirometry, continue pulse ox monitoring, follow ABGs     ====================CARDIOVASCULAR==================  -IWSTEMI  -s/p ASA brillinta atorva load at VS  -c/w ASA 81 qd, brillinta 90 BID, atorva 80 qd  TropI 124   [ ] trend CE  [ ] f/u a1c, lipid, tsh    -HFreEF  LVEDP 8-12, ventriculogram EF 40%  s/p 500 cc bolus in cath lab for hypotension    ticagrelor 90 milliGRAM(s) Oral every 12 hours    ===================HEMATOLOGIC/ONC ===================  -DVT ppx: Hep subc    heparin   Injectable 5000 Unit(s) SubCutaneous every 8 hours  ===================== RENAL =========================  -no active issue  Continue monitoring urine output    oxybutynin 5 milliGRAM(s) Oral two times a day    ==================== GASTROINTESTINAL===================  -DASH/TLC diet    dextrose 5%. 1000 milliLiter(s) (50 mL/Hr) IV Continuous <Continuous>  dextrose 5%. 1000 milliLiter(s) (100 mL/Hr) IV Continuous <Continuous>    =======================    ENDOCRINE  =====================  Hx of DM on metformin 1000mg qd  -ISS  -diabetic diet  -check a1c    dextrose 40% Gel 15 Gram(s) Oral once  dextrose 50% Injectable 25 Gram(s) IV Push once  dextrose 50% Injectable 12.5 Gram(s) IV Push once  dextrose 50% Injectable 25 Gram(s) IV Push once  glucagon  Injectable 1 milliGRAM(s) IntraMuscular once  insulin lispro (ADMELOG) corrective regimen sliding scale   SubCutaneous three times a day before meals  insulin lispro (ADMELOG) corrective regimen sliding scale   SubCutaneous at bedtime    ========================INFECTIOUS DISEASE================  Afebrile, WBC within normal limits   Continue trending WBC and monitoring fever curve   -no active issue  COVID-19 PCR negative.      Patient requires continuous monitoring with bedside rhythm monitoring, pulse ox monitoring, and intermittent blood gas analysis. Care plan discussed with ICU care team. Patient remained critical and at risk for life threatening decompensation.  Patient seen, examined and plan discussed with CCU team during rounds.     I have personally provided ____ minutes of critical care time excluding time spent on separate procedures, in addition to initial critical care time provided by the CICU Attending, Dr. Waters/ Lisette/ Mally/ Charlette/ Jason/ Madan .     By signing my name below, I, Peter Hunter, attest that this documentation has been prepared under the direction and in the presence of Kylah Fung NP  Electronically signed: Calista Hoffmann, 12-31-21 @ 20:12    I, Kylah Fung NP , personally performed the services described in this documentation. all medical record entries made by the scribe were at my direction and in my presence. I have reviewed the chart and agree that the record reflects my personal performance and is accurate and complete  Electronically signed: Kylah Fung NP       JOE GONZALEZ  MRN-[id number]  Patient is a 66y old  Male who presents with a chief complaint of STEMI (31 Dec 2021 14:18)    HPI:  66M history of HTN, DM, prostate ca, reported PCI in past (?) who presented to  with CP since yesterday. Pt states CP woke him up from sleep yesterday morning at 5AM, associated with R arm discomfort. No diaphoresis or SOB or n/v. CP became progressive worse from 6/10- 8/10 so he presented ot the ED. ECG showing SUJIT in inferior leads. VS at ED: T 98, -99, /87, satting well on RA. EKG c/w STEMI with NSR, rate 99, LAE, SUJIT inferior leads, ST depression aVL, narrow QRS. [facility] called for transfer for emergent cath. Patient accepted for transfer to cath lab. Loaded with ASA+Brilinta, Atorvastatin, and heparin bolus at . Patient with 3/10 CP on arrival here. CE still pending. No fever, chills, SOB, cough, abd pain, n/v, diarrhea. COVID vax x3, booster about 1 wk ago.    Pt states he had 2 stents placed 5 years ago at [facility]? However chart reviewed, could not find any cath report or documentation about this. He states he was told several years ago by his cardiologist he no longer needed to be on blood thinners for his stent.    Pt went for cath 12/31 with findings of 100% occlusion of RCA s/p DESx3, intracoronary integrelin 19mg given. LAD 80% occluded. Also given 1 of versed, 25 of fentanyl and 6000U heparin bolus. LVEDP 8-12, ventriculogram EF 40%, given 500cc bolus for hypotension and improved. Planned for proximal LAD on Monday . Post procedure, CP improved to 1/10.  Pt seen and evaluated in CICU. Denying any CP or SOB.  TropI 124 from . COVID-19 PCR negative. (31 Dec 2021 14:18)    Hospital Course:  12/31 Transferred to CICU     24 HOUR EVENTS:    REVIEW OF SYSTEMS:  CONSTITUTIONAL: No weakness, fevers or chills  EYES/ENT: No visual changes;  No vertigo or throat pain   NECK: No pain or stiffness  RESPIRATORY: No cough, wheezing, hemoptysis; No shortness of breath  CARDIOVASCULAR: No chest pain or palpitations  GASTROINTESTINAL: No abdominal or epigastric pain. No nausea, vomiting, or hematemesis; No diarrhea or constipation. No melena or hematochezia.  GENITOURINARY: No dysuria, frequency or hematuria  NEUROLOGICAL: No numbness or weakness  SKIN: No itching, burning, rashes, or lesions   HEME: no easy bruising or unexplained bleeding  ENDO: no heat intolerance, no cold intolerance  PSYCH: no SI, or depression  All other review of systems is negative unless indicated above.    ICU Vital Signs Last 24 Hrs  T(C): 36.3 (31 Dec 2021 19:00), Max: 37.1 (31 Dec 2021 14:30)  T(F): 97.3 (31 Dec 2021 19:00), Max: 98.7 (31 Dec 2021 14:30)  HR: 79 (31 Dec 2021 19:00) (73 - 102)  BP: 107/64 (31 Dec 2021 19:00) (107/64 - 138/77)  BP(mean): 80 (31 Dec 2021 19:00) (80 - 102)  ABP: --  ABP(mean): --  RR: 21 (31 Dec 2021 19:00) (17 - 24)  SpO2: 94% (31 Dec 2021 19:00) (93% - 100%)      CVP(mm Hg): --  CO: --  CI: --  PA: --  PA(mean): --  PA(direct): --  PCWP: --  LA: --  RA: --  SVR: --  SVRI: --  PVR: --  PVRI: --  I&O's Summary    31 Dec 2021 07:01  -  31 Dec 2021 20:12  --------------------------------------------------------  IN: 360 mL / OUT: 500 mL / NET: -140 mL        CAPILLARY BLOOD GLUCOSE    CAPILLARY BLOOD GLUCOSE      POCT Blood Glucose.: 243 mg/dL (31 Dec 2021 16:27)      PHYSICAL EXAM:  GENERAL: NAD, lying in bed comfortably  HEAD:  Atraumatic, Normocephalic  EYES: EOMI, PERRLA, conjunctiva and sclera clear  ENT: Moist mucous membranes  NECK: Supple, No JVD  CHEST/LUNG: Clear to auscultation bilaterally; No rales, rhonchi, wheezing, or rubs. Unlabored respirations  HEART: Regular rate and rhythm; No murmurs, rubs, or gallops  ABDOMEN: BSx4; Soft, nontender, nondistended  EXTREMITIES:  2+ Peripheral Pulses, brisk capillary refill. No clubbing, cyanosis, or edema  +R radial band  NERVOUS SYSTEM:  A&Ox3, no focal deficits   SKIN: No rashes or lesions  Psych: Normal speech, normal behavior, normal affect    ============================I/O===========================   I&O's Detail    31 Dec 2021 07:01  -  31 Dec 2021 20:12  --------------------------------------------------------  IN:    Oral Fluid: 360 mL  Total IN: 360 mL    OUT:    Voided (mL): 500 mL  Total OUT: 500 mL    Total NET: -140 mL        ============================ LABS =========================                        14.3   7.95  )-----------( 319      ( 31 Dec 2021 18:01 )             43.1     12-31    132<L>  |  99  |  10  ----------------------------<  227<H>  4.1   |  19<L>  |  0.58    Ca    9.5      31 Dec 2021 18:01  Phos  3.1     12-31  Mg     1.9     12-31    TPro  6.9  /  Alb  4.1  /  TBili  0.4  /  DBili  x   /  AST  32  /  ALT  11  /  AlkPhos  89  12-31    Troponin T, High Sensitivity Result: 739 ng/L (12-31-21 @ 18:01)    CKMB Units: 38.1 ng/mL (12-31-21 @ 18:01)    Creatine Kinase, Serum: 412 U/L (12-31-21 @ 18:01)      LIVER FUNCTIONS - ( 31 Dec 2021 18:01 )  Alb: 4.1 g/dL / Pro: 6.9 g/dL / ALK PHOS: 89 U/L / ALT: 11 U/L / AST: 32 U/L / GGT: x           PT/INR - ( 31 Dec 2021 17:57 )   PT: 12.2 sec;   INR: 1.02 ratio         PTT - ( 31 Dec 2021 17:57 )  PTT:49.0 sec    Lactate, Blood: 2.9 mmol/L (12-31-21 @ 18:01)      ======================Micro/Rad/Cardio=================  Telemtry: Reviewed   EKG: Reviewed  CXR: Reviewed  Culture: Reviewed   Echo:   Cath:   ======================================================  PAST MEDICAL & SURGICAL HISTORY:  HTN (hypertension)    Diabetes    Hypercholesterolemia    Sleep apnea  uses CPAP machine at night    Prostate cancer    History of pterygium excision  left eye      ====================ASSESSMENT ==============  66M history of HTN, DM, prostate ca, SHERRY (qhs CPAP) reported PCI in past (?) who presented to  with CP since yesterday, found to have IWSTEMI, load/brillinta txfer to [facility] for cath (12/31),  s/p 100% occlusion of RCA s/p DESx3, LAD 80%, plan for staging to LAD on Monday.    Plan:  ====================== NEUROLOGY=====================  -no active issue  Continue close monitoring of neuro status     ==================== RESPIRATORY======================  -on RA   -hx of SHERRY on CPAP, c/w home CPAP  - Encourage incentive spirometry, continue pulse ox monitoring, follow ABGs     ====================CARDIOVASCULAR==================  -IWSTEMI  -s/p ASA brillinta atorva load at VS  -c/w ASA 81 qd, brillinta 90 BID, atorva 80 qd  TropI 124   [ ] trend CE  [ ] f/u a1c, lipid, tsh    -HFreEF  LVEDP 8-12, ventriculogram EF 40%  s/p 500 cc bolus in cath lab for hypotension    ticagrelor 90 milliGRAM(s) Oral every 12 hours    ===================HEMATOLOGIC/ONC ===================  -DVT ppx: Hep subc    heparin   Injectable 5000 Unit(s) SubCutaneous every 8 hours  ===================== RENAL =========================  -no active issue  Continue monitoring urine output    oxybutynin 5 milliGRAM(s) Oral two times a day    ==================== GASTROINTESTINAL===================  -DASH/TLC diet    dextrose 5%. 1000 milliLiter(s) (50 mL/Hr) IV Continuous <Continuous>  dextrose 5%. 1000 milliLiter(s) (100 mL/Hr) IV Continuous <Continuous>    =======================    ENDOCRINE  =====================  Hx of DM on metformin 1000mg qd  -ISS  -diabetic diet  -check a1c    dextrose 40% Gel 15 Gram(s) Oral once  dextrose 50% Injectable 25 Gram(s) IV Push once  dextrose 50% Injectable 12.5 Gram(s) IV Push once  dextrose 50% Injectable 25 Gram(s) IV Push once  glucagon  Injectable 1 milliGRAM(s) IntraMuscular once  insulin lispro (ADMELOG) corrective regimen sliding scale   SubCutaneous three times a day before meals  insulin lispro (ADMELOG) corrective regimen sliding scale   SubCutaneous at bedtime    ========================INFECTIOUS DISEASE================  Afebrile, WBC within normal limits   Continue trending WBC and monitoring fever curve   -no active issue  COVID-19 PCR negative.      Patient requires continuous monitoring with bedside rhythm monitoring, pulse ox monitoring, and intermittent blood gas analysis. Care plan discussed with ICU care team. Patient remained critical and at risk for life threatening decompensation.  Patient seen, examined and plan discussed with CCU team during rounds.     I have personally provided ____ minutes of critical care time excluding time spent on separate procedures, in addition to initial critical care time provided by the CICU Attending, Dr. Knox    By signing my name below, I, Peter Hunter, attest that this documentation has been prepared under the direction and in the presence of Kylah Fung NP  Electronically signed: Calista Hoffmann, 12-31-21 @ 20:12    IKylah NP , personally performed the services described in this documentation. all medical record entries made by the olvinibjudy were at my direction and in my presence. I have reviewed the chart and agree that the record reflects my personal performance and is accurate and complete  Electronically signed: Kylah Fung NP       JOE GONZALEZ  MRN-[id number]  Patient is a 66y old  Male who presents with a chief complaint of STEMI (31 Dec 2021 14:18)    HPI:  66M history of HTN, DM, prostate ca, reported PCI in past (?) who presented to  with CP since yesterday. Pt states CP woke him up from sleep yesterday morning at 5AM, associated with R arm discomfort. No diaphoresis or SOB or n/v. CP became progressive worse from 6/10- 8/10 so he presented ot the ED. ECG showing SUJIT in inferior leads. VS at ED: T 98, -99, /87, satting well on RA. EKG c/w STEMI with NSR, rate 99, LAE, SUJIT inferior leads, ST depression aVL, narrow QRS. [facility] called for transfer for emergent cath. Patient accepted for transfer to cath lab. Loaded with ASA+Brilinta, Atorvastatin, and heparin bolus at . Patient with 3/10 CP on arrival here. CE still pending. No fever, chills, SOB, cough, abd pain, n/v, diarrhea. COVID vax x3, booster about 1 wk ago.    Pt states he had 2 stents placed 5 years ago at [facility]? However chart reviewed, could not find any cath report or documentation about this. He states he was told several years ago by his cardiologist he no longer needed to be on blood thinners for his stent.    Pt went for cath 12/31 with findings of 100% occlusion of RCA s/p DESx3, intracoronary integrelin 19mg given. LAD 80% occluded. Also given 1 of versed, 25 of fentanyl and 6000U heparin bolus. LVEDP 8-12, ventriculogram EF 40%, given 500cc bolus for hypotension and improved. Planned for proximal LAD on Monday . Post procedure, CP improved to 1/10.  Pt seen and evaluated in CICU. Denying any CP or SOB.  TropI 124 from . COVID-19 PCR negative. (31 Dec 2021 14:18)    Hospital Course:  12/31 Transferred to CICU     24 HOUR EVENTS:    REVIEW OF SYSTEMS:  CONSTITUTIONAL: No weakness, fevers or chills  EYES/ENT: No visual changes;  No vertigo or throat pain   NECK: No pain or stiffness  RESPIRATORY: No cough, wheezing, hemoptysis; No shortness of breath  CARDIOVASCULAR: No chest pain or palpitations  GASTROINTESTINAL: No abdominal or epigastric pain. No nausea, vomiting, or hematemesis; No diarrhea or constipation. No melena or hematochezia.  GENITOURINARY: No dysuria, frequency or hematuria  NEUROLOGICAL: No numbness or weakness  SKIN: No itching, burning, rashes, or lesions   HEME: no easy bruising or unexplained bleeding  ENDO: no heat intolerance, no cold intolerance  PSYCH: no SI, or depression  All other review of systems is negative unless indicated above.    ICU Vital Signs Last 24 Hrs  T(C): 36.3 (31 Dec 2021 19:00), Max: 37.1 (31 Dec 2021 14:30)  T(F): 97.3 (31 Dec 2021 19:00), Max: 98.7 (31 Dec 2021 14:30)  HR: 79 (31 Dec 2021 19:00) (73 - 102)  BP: 107/64 (31 Dec 2021 19:00) (107/64 - 138/77)  BP(mean): 80 (31 Dec 2021 19:00) (80 - 102)  ABP: --  ABP(mean): --  RR: 21 (31 Dec 2021 19:00) (17 - 24)  SpO2: 94% (31 Dec 2021 19:00) (93% - 100%)      CVP(mm Hg): --  CO: --  CI: --  PA: --  PA(mean): --  PA(direct): --  PCWP: --  LA: --  RA: --  SVR: --  SVRI: --  PVR: --  PVRI: --  I&O's Summary    31 Dec 2021 07:01  -  31 Dec 2021 20:12  --------------------------------------------------------  IN: 360 mL / OUT: 500 mL / NET: -140 mL        CAPILLARY BLOOD GLUCOSE    CAPILLARY BLOOD GLUCOSE      POCT Blood Glucose.: 243 mg/dL (31 Dec 2021 16:27)      PHYSICAL EXAM:  GENERAL: NAD, lying in bed comfortably  HEAD:  Atraumatic, Normocephalic  EYES: EOMI, PERRLA, conjunctiva and sclera clear  ENT: Moist mucous membranes  NECK: Supple, No JVD  CHEST/LUNG: Clear to auscultation bilaterally; No rales, rhonchi, wheezing, or rubs. Unlabored respirations  HEART: Regular rate and rhythm; No murmurs, rubs, or gallops  ABDOMEN: BSx4; Soft, nontender, nondistended  EXTREMITIES:  2+ Peripheral Pulses, brisk capillary refill. No clubbing, cyanosis, or edema  +R radial band  NERVOUS SYSTEM:  A&Ox3, no focal deficits   SKIN: No rashes or lesions  Psych: Normal speech, normal behavior, normal affect    ============================I/O===========================   I&O's Detail    31 Dec 2021 07:01  -  31 Dec 2021 20:12  --------------------------------------------------------  IN:    Oral Fluid: 360 mL  Total IN: 360 mL    OUT:    Voided (mL): 500 mL  Total OUT: 500 mL    Total NET: -140 mL        ============================ LABS =========================                        14.3   7.95  )-----------( 319      ( 31 Dec 2021 18:01 )             43.1     12-31    132<L>  |  99  |  10  ----------------------------<  227<H>  4.1   |  19<L>  |  0.58    Ca    9.5      31 Dec 2021 18:01  Phos  3.1     12-31  Mg     1.9     12-31    TPro  6.9  /  Alb  4.1  /  TBili  0.4  /  DBili  x   /  AST  32  /  ALT  11  /  AlkPhos  89  12-31    Troponin T, High Sensitivity Result: 739 ng/L (12-31-21 @ 18:01)    CKMB Units: 38.1 ng/mL (12-31-21 @ 18:01)    Creatine Kinase, Serum: 412 U/L (12-31-21 @ 18:01)      LIVER FUNCTIONS - ( 31 Dec 2021 18:01 )  Alb: 4.1 g/dL / Pro: 6.9 g/dL / ALK PHOS: 89 U/L / ALT: 11 U/L / AST: 32 U/L / GGT: x           PT/INR - ( 31 Dec 2021 17:57 )   PT: 12.2 sec;   INR: 1.02 ratio         PTT - ( 31 Dec 2021 17:57 )  PTT:49.0 sec    Lactate, Blood: 2.9 mmol/L (12-31-21 @ 18:01)      ======================Micro/Rad/Cardio=================  Telemtry: Reviewed   EKG: Reviewed  CXR: Reviewed  Culture: Reviewed   Echo:   Cath:   ======================================================  PAST MEDICAL & SURGICAL HISTORY:  HTN (hypertension)    Diabetes    Hypercholesterolemia    Sleep apnea  uses CPAP machine at night    Prostate cancer    History of pterygium excision  left eye      ====================ASSESSMENT ==============  66M history of HTN, DM, prostate ca, SHERRY (qhs CPAP) reported PCI in past (?) who presented to  with CP since yesterday, found to have IWSTEMI, load/brillinta txfer to [facility] for cath (12/31),  s/p 100% occlusion of RCA s/p DESx3, LAD 80%, plan for staging to LAD on Monday.    Plan:  ====================== NEUROLOGY=====================  -no active issue  Continue close monitoring of neuro status     ==================== RESPIRATORY======================  -on RA   -hx of SHERRY on CPAP, c/w home CPAP  - Encourage incentive spirometry, continue pulse ox monitoring, follow ABGs     ====================CARDIOVASCULAR==================  -IWSTEMI  -s/p ASA brillinta atorva load at VS  -c/w ASA 81 qd, brillinta 90 BID, atorva 80 qd  -s/p PCI KINDRA x3 to %, pLAD 70%. staging to LAD on Monday   -Trending CE till peak   -starting lopressor 12.5 BID tomorrow   -TTE ordered for in the AM    ticagrelor 90 milliGRAM(s) Oral every 12 hours    ===================HEMATOLOGIC/ONC ===================  -DVT ppx: Hep subc    heparin   Injectable 5000 Unit(s) SubCutaneous every 8 hours  ===================== RENAL =========================  -no active issue  Continue monitoring urine output    oxybutynin 5 milliGRAM(s) Oral two times a day    ==================== GASTROINTESTINAL===================  -DASH/TLC diet    dextrose 5%. 1000 milliLiter(s) (50 mL/Hr) IV Continuous <Continuous>  dextrose 5%. 1000 milliLiter(s) (100 mL/Hr) IV Continuous <Continuous>    =======================    ENDOCRINE  =====================  Hx of DM on metformin 1000mg qd  -ISS  -diabetic diet  -check a1c    dextrose 40% Gel 15 Gram(s) Oral once  dextrose 50% Injectable 25 Gram(s) IV Push once  dextrose 50% Injectable 12.5 Gram(s) IV Push once  dextrose 50% Injectable 25 Gram(s) IV Push once  glucagon  Injectable 1 milliGRAM(s) IntraMuscular once  insulin lispro (ADMELOG) corrective regimen sliding scale   SubCutaneous three times a day before meals  insulin lispro (ADMELOG) corrective regimen sliding scale   SubCutaneous at bedtime    ========================INFECTIOUS DISEASE================  Afebrile, WBC within normal limits   Continue trending WBC and monitoring fever curve   -no active issue  COVID-19 PCR negative.      Patient requires continuous monitoring with bedside rhythm monitoring, pulse ox monitoring, and intermittent blood gas analysis. Care plan discussed with ICU care team. Patient remained critical and at risk for life threatening decompensation.  Patient seen, examined and plan discussed with CCU team during rounds.     I have personally provided __30__ minutes of critical care time excluding time spent on separate procedures, in addition to initial critical care time provided by the CICU Attending, Dr. MILEY Huerta    By signing my name below, I, Peter Hunter, attest that this documentation has been prepared under the direction and in the presence of Kylah Fung NP  Electronically signed: Calista Hoffmann, 12-31-21 @ 20:12    IKylah NP , personally performed the services described in this documentation. all medical record entries made by the olvinibe were at my direction and in my presence. I have reviewed the chart and agree that the record reflects my personal performance and is accurate and complete  Electronically signed: Kylah Fung NP

## 2021-12-31 NOTE — PATIENT PROFILE ADULT - HISTORY OF COVID-19 VACCINATION
CHEST ONE VIEW PORTABLE



CLINICAL HISTORY: Chest pain.    



COMPARISON STUDY:  Chest radiograph March 5, 2013.



FINDINGS: Lung volumes are at the upper limits of normal. Nipple shadows project

over each lung. There is no consolidation or evidence of pulmonary edema.

Cardiomediastinal silhouette is normal. There is no pneumothorax or pleural

effusion. 



IMPRESSION:  No acute cardiopulmonary findings. 









Electronically signed by:  Chucho Sutton M.D.

9/6/2017 5:51 PM



Dictated Date/Time:  9/6/2017 5:50 PM Yes

## 2021-12-31 NOTE — H&P ADULT - ASSESSMENT
66M history of HTN, DM, prostate ca, SHERRY (qhs CPAP) reported PCI in past (?) who presented to  with CP since yesterday, found to have IWSTEMI, load/brillinta txfer to [facility] for cath (12/31),  s/p 100% occlusion of RCA s/p DESx3, plan for staging to LAD on Monday.    #Neuro:  -no active issue    #Cardiovascular:  -IWSTEMI  -s/p ASA brillinta atorva load at   -c/w ASA 81 qd, brillinta 90 BID, atorva 80 qd  TropI 124   [ ] trend CE  [ ] f/u a1c, lipid, tsh    -HFreEF  LVEDP 8-12, ventriculogram EF 40%  s/p 500 cc bolus in cath lab for hypotension    #Pulmonary:  -on RA   -hx of SHERRY on CPAP, c/w home CPAP    #FEN/GI:  -DASH/TLC diet    #Renal:  -no active issue    #:  -no active issue      #ID:  -no active issue  COVID-19 PCR negative.    #Heme:  -DVT ppx: Hep subc    #Endo:  -check a1c    #Ethics:  - 66M history of HTN, DM, prostate ca, SHERRY (qhs CPAP) reported PCI in past (?) who presented to  with CP since yesterday, found to have IWSTEMI, load/brillinta txfer to [facility] for cath (12/31),  s/p 100% occlusion of RCA s/p DESx3, LAD 80%, plan for staging to LAD on Monday.    #Neuro:  -no active issue    #Cardiovascular:  -IWSTEMI  -s/p ASA brillinta atorva load at   -c/w ASA 81 qd, brillinta 90 BID, atorva 80 qd  TropI 124   [ ] trend CE  [ ] f/u a1c, lipid, tsh    -HFreEF  LVEDP 8-12, ventriculogram EF 40%  s/p 500 cc bolus in cath lab for hypotension    #Pulmonary:  -on RA   -hx of SHERRY on CPAP, c/w home CPAP    #FEN/GI:  -DASH/TLC diet    #Renal:  -no active issue    #:  -no active issue  -uses clomipramine PRN at home for intercourse    #ID:  -no active issue  COVID-19 PCR negative.    #Heme:  -DVT ppx: Hep subc    #Endo:  -check a1c    #Ethics:  - 66M history of HTN, DM, prostate ca, SHERRY (qhs CPAP) reported PCI in past (?) who presented to  with CP since yesterday, found to have IWSTEMI, load/brillinta txfer to [facility] for cath (12/31),  s/p 100% occlusion of RCA s/p DESx3, LAD 80%, plan for staging to LAD on Monday.    #Neuro:  -no active issue    #Cardiovascular:  -IWSTEMI  -s/p ASA brillinta atorva load at   -c/w ASA 81 qd, brillinta 90 BID, atorva 80 qd  TropI 124   [ ] trend CE  [ ] f/u a1c, lipid, tsh    -HFreEF  LVEDP 8-12, ventriculogram EF 40%  s/p 500 cc bolus in cath lab for hypotension    #Pulmonary:  -on RA   -hx of SHERRY on CPAP, c/w home CPAP    #FEN/GI:  -DASH/TLC diet    #Renal:  -no active issue    #:  -no active issue  -uses clomipramine PRN at home for intercourse    #ID:  -no active issue  COVID-19 PCR negative.    #Heme:  -DVT ppx: Hep subc    #Endo:  Hx of DM on metformin 1000mg qd  -ISS  -diabetic diet  -check a1c    #Ethics:  -

## 2021-12-31 NOTE — ED PROVIDER NOTE - CLINICAL SUMMARY MEDICAL DECISION MAKING FREE TEXT BOX
cp. stemi. [facility]  I read ekg as nsr rate 99, left atrial enlargement, st elevations inferior leads, st depression in avl, qtc 464, narrow qrs, normal axis.

## 2021-12-31 NOTE — ED PROVIDER NOTE - OBJECTIVE STATEMENT
66m hx htn, dm, hld pw cp since yesterday. mid sternal, crushing. no nausea or vomiting. no sob. worse since yesterday.

## 2021-12-31 NOTE — H&P ADULT - HISTORY OF PRESENT ILLNESS
66M history of HTN, DM, prostate ca, reported PCI in past (?) who presented to  with CP since yesterday, ECG showing SUJIT in inferior leads. VS at ED: T 98, -99, /87, satting well on RA. EKG c/w STEMI with NSR, rate 99, LAE, SUJIT inferior leads, ST depression aVL, narrow QRS. [facility] called for transfer for emergent cath. Patient accepted for transfer to cath lab. Loaded with ASA+Brilinta, Atorvastatin, and heparin bolus at . Patient with 3/10 CP on arrival here. CE still pending. No fever, chills, SOB, cough, abd pain, n/v, diarrhea.     Pt went for cath 12/31 with findings of 100% occlusion of RCA s/p DESx3, intracoronary integrelin 19mg given. Also given 1 of versed, 25 of fentanyl and 6000U heparin bolus. LVEDP 8-12, ventriculogram EF 40%, given 500cc bolus for hypotension and improved. Planned for proximal LAD on Monday . Post procedure, CP improved to 1/10.     TropI 124 from VS. COVID-19 PCR negative. 66M history of HTN, DM, prostate ca, reported PCI in past (?) who presented to  with CP since yesterday. Pt states CP woke him up from sleep yesterday morning at 5AM, associated with R arm discomfort. No diaphoresis or SOB or n/v. CP became progressive worse from 6/10- 8/10 so he presented ot the ED. ECG showing SUJIT in inferior leads. VS at ED: T 98, -99, /87, satting well on RA. EKG c/w STEMI with NSR, rate 99, LAE, SUJIT inferior leads, ST depression aVL, narrow QRS. [facility] called for transfer for emergent cath. Patient accepted for transfer to cath lab. Loaded with ASA+Brilinta, Atorvastatin, and heparin bolus at . Patient with 3/10 CP on arrival here. CE still pending. No fever, chills, SOB, cough, abd pain, n/v, diarrhea. COVID vax x3, booster about 1 wk ago.    Pt states he had 2 stents placed 5 years ago at [facility]? However chart reviewed, could not find any cath report or documentation about this. He states he was told several years ago by his cardiologist he no longer needed to be on blood thinners for his stent.    Pt went for cath 12/31 with findings of 100% occlusion of RCA s/p DESx3, intracoronary integrelin 19mg given. Also given 1 of versed, 25 of fentanyl and 6000U heparin bolus. LVEDP 8-12, ventriculogram EF 40%, given 500cc bolus for hypotension and improved. Planned for proximal LAD on Monday . Post procedure, CP improved to 1/10.  Pt seen and evaluated in CICU. Denying any CP or SOB.  TropI 124 from VS. COVID-19 PCR negative. 66M history of HTN, DM, prostate ca, reported PCI in past (?) who presented to  with CP since yesterday. Pt states CP woke him up from sleep yesterday morning at 5AM, associated with R arm discomfort. No diaphoresis or SOB or n/v. CP became progressive worse from 6/10- 8/10 so he presented ot the ED. ECG showing SUJIT in inferior leads. VS at ED: T 98, -99, /87, satting well on RA. EKG c/w STEMI with NSR, rate 99, LAE, SUJIT inferior leads, ST depression aVL, narrow QRS. [facility] called for transfer for emergent cath. Patient accepted for transfer to cath lab. Loaded with ASA+Brilinta, Atorvastatin, and heparin bolus at . Patient with 3/10 CP on arrival here. CE still pending. No fever, chills, SOB, cough, abd pain, n/v, diarrhea. COVID vax x3, booster about 1 wk ago.    Pt states he had 2 stents placed 5 years ago at [facility]? However chart reviewed, could not find any cath report or documentation about this. He states he was told several years ago by his cardiologist he no longer needed to be on blood thinners for his stent.    Pt went for cath 12/31 with findings of 100% occlusion of RCA s/p DESx3, intracoronary integrelin 19mg given. LAD 80% occluded. Also given 1 of versed, 25 of fentanyl and 6000U heparin bolus. LVEDP 8-12, ventriculogram EF 40%, given 500cc bolus for hypotension and improved. Planned for proximal LAD on Monday . Post procedure, CP improved to 1/10.  Pt seen and evaluated in CICU. Denying any CP or SOB.  TropI 124 from VS. COVID-19 PCR negative.

## 2021-12-31 NOTE — H&P ADULT - NSHPLABSRESULTS_GEN_ALL_CORE
.  LABS:                         15.5   7.57  )-----------( 315      ( 31 Dec 2021 12:21 )             46.8     12-31    134<L>  |  103  |  13  ----------------------------<  290<H>  4.7   |  23  |  1.02    Ca    9.2      31 Dec 2021 12:21  Mg     2.1     12-31    TPro  8.0  /  Alb  3.6  /  TBili  0.7  /  DBili  x   /  AST  16  /  ALT  18  /  AlkPhos  98  12-31    PT/INR - ( 31 Dec 2021 12:21 )   PT: 12.4 sec;   INR: 1.07 ratio         PTT - ( 31 Dec 2021 12:21 )  PTT:37.0 sec    Serum Pro-Brain Natriuretic Peptide: 114 pg/mL (12-31 @ 12:21)        RADIOLOGY, EKG & ADDITIONAL TESTS: Reviewed.

## 2021-12-31 NOTE — ED ADULT NURSE NOTE - OBJECTIVE STATEMENT
Patient is alert and oriented x4. Came in for mid chest pain. Pain score is 5/10. Denies any n/v, dizziness, fever or shortness of breath. Has a history of dm, hpn and high cholesterol. EKG done and showed STEMI. Code STEMI called. Patient was given oral meds as ordered. Heparin 4k units bolus administered. Patient left unit in stable condition with EMS. Care handoff given to receiving SOLOMON Campbell.

## 2021-12-31 NOTE — CONSULT NOTE ADULT - SUBJECTIVE AND OBJECTIVE BOX
Patient seen and evaluated at bedside    Chief Complaint: CP & STEMI    HPI:  66M history of HTN, DM, prostate ca, reported PCI in past (?) who presented to  with CP since yesterday, ECG showing SUJIT in inferior leads. [facility] called for transfer for emergent cath. Patient accepted for transfer to cath lab. Loaded with ASA+Brilinta, Atorvastatin, and heparin bolus. Patient with 3/10 CP on arrival here. CE still pending. No fever, chills, SOB, cough, abd pain, n/v, diarrhea.     PMHx:   HTN (hypertension)    Diabetes    Hypercholesterolemia    Sleep apnea    Prostate cancer        PSHx:   No significant past surgical history    History of pterygium excision        Allergies:  No Known Allergies      Home Meds: As per admission medication reconcilliation.     Current Medications:       FAMILY HISTORY: NC      Social History: No tobacco use, no etoh.     REVIEW OF SYSTEMS:  Constitutional:     [x ] negative [ ] fevers [ ] chills [ ] weight loss [ ] weight gain  HEENT:                  [x ] negative [ ] dry eyes [ ] eye irritation [ ] postnasal drip [ ] nasal congestion  CV:                         [ ] negative  [ x] chest pain [ ] orthopnea [ ] palpitations [ ] murmur  Resp:                     [x ] negative [ ] cough [ ] shortness of breath [ ] dyspnea [ ] wheezing [ ] sputum [ ]hemoptysis  GI:                          [ x] negative [ ] nausea [ ] vomiting [ ] diarrhea [ ] constipation [ ] abd pain [ ] dysphagia   :                        [ x] negative [ ] dysuria [ ] nocturia [ ] hematuria [ ] increased urinary frequency  Musculoskeletal: [x ] negative [ ] back pain [ ] myalgias [ ] arthralgias [ ] fracture  Skin:                       [ x] negative [ ] rash [ ] itch  Neurological:        [ x] negative [ ] headache [ ] dizziness [ ] syncope [ ] weakness [ ] numbness  Psychiatric:           [ x] negative [ ] anxiety [ ] depression  Endocrine:            [ x] negative [ ] diabetes [ ] thyroid problem  Heme/Lymph:      [ x] negative [ ] anemia [ ] bleeding problem  Allergic/Immune: [ x] negative [ ] itchy eyes [ ] nasal discharge [ ] hives [ ] angioedema    [ x] All other systems negative  [ ] Unable to assess ROS due to      Physical Exam:  T(F): 98.1 (12-31), Max: 98.1 (12-31)  HR: 99 (12-31) (99 - 102)  BP: 137/90 (12-31) (136/87 - 137/90)  RR: 18 (12-31)  SpO2: 100% (12-31)  General: Alert, no acute distress, appears comfortable   HEENT: No scleral icterus, EOMI, no facial dysmorphia, no external ear lesions   Cardiac: Regular rate and rhythm, no murmurs, no rubs, no gallops   Pulmonary: Clear breath sounds throughout, no wheezing, no stridor, no crackles   Abdomen: Nondistended, nontender, appears soft   Skin: no obvious rash or lesions   Extremities: no LE edema  Neurological: Moving all 4 extremities, no overt focal deficits noted   Psych: normal mood and affect     Cardiovascular Diagnostic Testing:    ECG: Personally reviewed:  SUJIT in inferior leads.     Echo: Personally reviewed:  Pending.     CXR: Personally reviewed    Labs: Personally reviewed                        15.5   7.57  )-----------( 315      ( 31 Dec 2021 12:21 )             46.8     12-31    134<L>  |  103  |  13  ----------------------------<  290<H>  4.7   |  23  |  1.02    Ca    9.2      31 Dec 2021 12:21  Mg     2.1     12-31    TPro  x   /  Alb  3.6  /  TBili  x   /  DBili  x   /  AST  16  /  ALT  18  /  AlkPhos  x   12-31    PT/INR - ( 31 Dec 2021 12:21 )   PT: 12.4 sec;   INR: 1.07 ratio         PTT - ( 31 Dec 2021 12:21 )  PTT:37.0 sec  Serum Pro-Brain Natriuretic Peptide: 114 pg/mL (12-31 @ 12:21)

## 2021-12-31 NOTE — PATIENT PROFILE ADULT - FALL HARM RISK - UNIVERSAL INTERVENTIONS
Bed in lowest position, wheels locked, appropriate side rails in place/Call bell, personal items and telephone in reach/Instruct patient to call for assistance before getting out of bed or chair/Non-slip footwear when patient is out of bed/[place] to call system/Physically safe environment - no spills, clutter or unnecessary equipment/Purposeful Proactive Rounding/Room/bathroom lighting operational, light cord in reach

## 2021-12-31 NOTE — ED ADULT NURSE NOTE - NSIMPLEMENTINTERV_GEN_ALL_ED
Implemented All Universal Safety Interventions:  [place] to call system. Call bell, personal items and telephone within reach. Instruct patient to call for assistance. Room bathroom lighting operational. Non-slip footwear when patient is off stretcher. Physically safe environment: no spills, clutter or unnecessary equipment. Stretcher in lowest position, wheels locked, appropriate side rails in place.

## 2021-12-31 NOTE — H&P ADULT - NSHPPHYSICALEXAM_GEN_ALL_CORE
VITALS:   T(C): 37.1 (12-31-21 @ 14:30), Max: 37.1 (12-31-21 @ 14:30)  HR: 82 (12-31-21 @ 15:00) (80 - 102)  BP: 132/75 (12-31-21 @ 15:00) (115/83 - 137/90)  RR: 23 (12-31-21 @ 15:00) (18 - 24)  SpO2: 93% (12-31-21 @ 15:00) (93% - 100%)    GENERAL: NAD, lying in bed comfortably  HEAD:  Atraumatic, Normocephalic  EYES: EOMI, PERRLA, conjunctiva and sclera clear  ENT: Moist mucous membranes  NECK: Supple, No JVD  CHEST/LUNG: Clear to auscultation bilaterally; No rales, rhonchi, wheezing, or rubs. Unlabored respirations  HEART: Regular rate and rhythm; No murmurs, rubs, or gallops  ABDOMEN: BSx4; Soft, nontender, nondistended  EXTREMITIES:  2+ Peripheral Pulses, brisk capillary refill. No clubbing, cyanosis, or edema  +R radial band  NERVOUS SYSTEM:  A&Ox3, no focal deficits   SKIN: No rashes or lesions  Psych: Normal speech, normal behavior, normal affect

## 2021-12-31 NOTE — CONSULT NOTE ADULT - ASSESSMENT
66M history of HTN, DM, prostate ca, reported PCI in past (?) who presented with CP since yesterday, ECG SUJIT in inferior leads, taken emergency to cath lab c/f STEMI.     #STEMI - SUJIT in inferior leads & substernal chest pain.     Plan:  -Resume ASA+Brilinta.   -Obtain lipid panel, HgbA1c, TSH.   -Resume Atorvastatin with LDL goal of 70 in setting of DM.   -Initiate BB within 24 hours if HR&BP allow  -Obtain formal TTE  -Admit to CICU post cath    Aurea Mcclellan MD  Cardiology Fellow - PGY 4  For all New Consults and Questions:  [email or web address]   Login: vasile 66M history of HTN, DM, prostate ca, reported PCI in past (?) who presented with CP since yesterday, ECG SUJIT in inferior leads, taken emergency to cath lab c/f STEMI.     #STEMI - SUJIT in inferior leads & substernal chest pain. S/p KINDRA to RPDA & RCA.      Plan:  -Resume ASA+Brilinta.   -Obtain lipid panel, HgbA1c, TSH.   -Resume Atorvastatin with LDL goal of 70 in setting of DM.   -Initiate BB within 24 hours if HR&BP allow.  -Obtain formal TTE.  -Admit to CICU post cath.  -Plan for staged PCI to pLAD.    Aurea Mcclellan MD  Cardiology Fellow - PGY 4  For all New Consults and Questions:  [email or web address]   Login: [email or web address]

## 2022-01-01 DIAGNOSIS — I21.3 ST ELEVATION (STEMI) MYOCARDIAL INFARCTION OF UNSPECIFIED SITE: ICD-10-CM

## 2022-01-01 DIAGNOSIS — I10 ESSENTIAL (PRIMARY) HYPERTENSION: ICD-10-CM

## 2022-01-01 DIAGNOSIS — R79.89 OTHER SPECIFIED ABNORMAL FINDINGS OF BLOOD CHEMISTRY: ICD-10-CM

## 2022-01-01 DIAGNOSIS — Z29.9 ENCOUNTER FOR PROPHYLACTIC MEASURES, UNSPECIFIED: ICD-10-CM

## 2022-01-01 DIAGNOSIS — E78.5 HYPERLIPIDEMIA, UNSPECIFIED: ICD-10-CM

## 2022-01-01 DIAGNOSIS — E87.1 HYPO-OSMOLALITY AND HYPONATREMIA: ICD-10-CM

## 2022-01-01 DIAGNOSIS — E11.65 TYPE 2 DIABETES MELLITUS WITH HYPERGLYCEMIA: ICD-10-CM

## 2022-01-01 LAB
A1C WITH ESTIMATED AVERAGE GLUCOSE RESULT: 11.9 % — HIGH (ref 4–5.6)
ALBUMIN SERPL ELPH-MCNC: 3.7 G/DL — SIGNIFICANT CHANGE UP (ref 3.3–5)
ALP SERPL-CCNC: 84 U/L — SIGNIFICANT CHANGE UP (ref 40–120)
ALT FLD-CCNC: 10 U/L — SIGNIFICANT CHANGE UP (ref 10–45)
ANION GAP SERPL CALC-SCNC: 15 MMOL/L — SIGNIFICANT CHANGE UP (ref 5–17)
AST SERPL-CCNC: 36 U/L — SIGNIFICANT CHANGE UP (ref 10–40)
BASOPHILS # BLD AUTO: 0.04 K/UL — SIGNIFICANT CHANGE UP (ref 0–0.2)
BASOPHILS NFR BLD AUTO: 0.6 % — SIGNIFICANT CHANGE UP (ref 0–2)
BILIRUB SERPL-MCNC: 0.3 MG/DL — SIGNIFICANT CHANGE UP (ref 0.2–1.2)
BUN SERPL-MCNC: 13 MG/DL — SIGNIFICANT CHANGE UP (ref 7–23)
CALCIUM SERPL-MCNC: 9.2 MG/DL — SIGNIFICANT CHANGE UP (ref 8.4–10.5)
CHLORIDE SERPL-SCNC: 99 MMOL/L — SIGNIFICANT CHANGE UP (ref 96–108)
CHOLEST SERPL-MCNC: 191 MG/DL — SIGNIFICANT CHANGE UP
CK MB BLD-MCNC: 6.5 % — HIGH (ref 0–3.5)
CK MB BLD-MCNC: 8.1 % — HIGH (ref 0–3.5)
CK MB CFR SERPL CALC: 26 NG/ML — HIGH (ref 0–6.7)
CK MB CFR SERPL CALC: 35.3 NG/ML — HIGH (ref 0–6.7)
CK SERPL-CCNC: 399 U/L — HIGH (ref 30–200)
CK SERPL-CCNC: 437 U/L — HIGH (ref 30–200)
CO2 SERPL-SCNC: 18 MMOL/L — LOW (ref 22–31)
CREAT SERPL-MCNC: 0.72 MG/DL — SIGNIFICANT CHANGE UP (ref 0.5–1.3)
EOSINOPHIL # BLD AUTO: 0.04 K/UL — SIGNIFICANT CHANGE UP (ref 0–0.5)
EOSINOPHIL NFR BLD AUTO: 0.6 % — SIGNIFICANT CHANGE UP (ref 0–6)
ESTIMATED AVERAGE GLUCOSE: 295 MG/DL — HIGH (ref 68–114)
GLUCOSE BLDC GLUCOMTR-MCNC: 210 MG/DL — HIGH (ref 70–99)
GLUCOSE BLDC GLUCOMTR-MCNC: 234 MG/DL — HIGH (ref 70–99)
GLUCOSE BLDC GLUCOMTR-MCNC: 243 MG/DL — HIGH (ref 70–99)
GLUCOSE BLDC GLUCOMTR-MCNC: 376 MG/DL — HIGH (ref 70–99)
GLUCOSE SERPL-MCNC: 271 MG/DL — HIGH (ref 70–99)
HCT VFR BLD CALC: 40.7 % — SIGNIFICANT CHANGE UP (ref 39–50)
HDLC SERPL-MCNC: 41 MG/DL — SIGNIFICANT CHANGE UP
HGB BLD-MCNC: 13.3 G/DL — SIGNIFICANT CHANGE UP (ref 13–17)
IMM GRANULOCYTES NFR BLD AUTO: 0.3 % — SIGNIFICANT CHANGE UP (ref 0–1.5)
LACTATE SERPL-SCNC: 2 MMOL/L — SIGNIFICANT CHANGE UP (ref 0.7–2)
LIPID PNL WITH DIRECT LDL SERPL: 130 MG/DL — HIGH
LYMPHOCYTES # BLD AUTO: 2.42 K/UL — SIGNIFICANT CHANGE UP (ref 1–3.3)
LYMPHOCYTES # BLD AUTO: 36.7 % — SIGNIFICANT CHANGE UP (ref 13–44)
MAGNESIUM SERPL-MCNC: 2.1 MG/DL — SIGNIFICANT CHANGE UP (ref 1.6–2.6)
MCHC RBC-ENTMCNC: 30.6 PG — SIGNIFICANT CHANGE UP (ref 27–34)
MCHC RBC-ENTMCNC: 32.7 GM/DL — SIGNIFICANT CHANGE UP (ref 32–36)
MCV RBC AUTO: 93.6 FL — SIGNIFICANT CHANGE UP (ref 80–100)
MONOCYTES # BLD AUTO: 0.66 K/UL — SIGNIFICANT CHANGE UP (ref 0–0.9)
MONOCYTES NFR BLD AUTO: 10 % — SIGNIFICANT CHANGE UP (ref 2–14)
NEUTROPHILS # BLD AUTO: 3.41 K/UL — SIGNIFICANT CHANGE UP (ref 1.8–7.4)
NEUTROPHILS NFR BLD AUTO: 51.8 % — SIGNIFICANT CHANGE UP (ref 43–77)
NON HDL CHOLESTEROL: 150 MG/DL — HIGH
NRBC # BLD: 0 /100 WBCS — SIGNIFICANT CHANGE UP (ref 0–0)
PHOSPHATE SERPL-MCNC: 3.6 MG/DL — SIGNIFICANT CHANGE UP (ref 2.5–4.5)
PLATELET # BLD AUTO: 274 K/UL — SIGNIFICANT CHANGE UP (ref 150–400)
POTASSIUM SERPL-MCNC: 4 MMOL/L — SIGNIFICANT CHANGE UP (ref 3.5–5.3)
POTASSIUM SERPL-SCNC: 4 MMOL/L — SIGNIFICANT CHANGE UP (ref 3.5–5.3)
PROT SERPL-MCNC: 6.4 G/DL — SIGNIFICANT CHANGE UP (ref 6–8.3)
RBC # BLD: 4.35 M/UL — SIGNIFICANT CHANGE UP (ref 4.2–5.8)
RBC # FLD: 13.2 % — SIGNIFICANT CHANGE UP (ref 10.3–14.5)
SODIUM SERPL-SCNC: 132 MMOL/L — LOW (ref 135–145)
TRIGL SERPL-MCNC: 100 MG/DL — SIGNIFICANT CHANGE UP
TROPONIN T, HIGH SENSITIVITY RESULT: 616 NG/L — HIGH (ref 0–51)
TROPONIN T, HIGH SENSITIVITY RESULT: 918 NG/L — HIGH (ref 0–51)
TSH SERPL-MCNC: 0.09 UIU/ML — LOW (ref 0.27–4.2)
WBC # BLD: 6.59 K/UL — SIGNIFICANT CHANGE UP (ref 3.8–10.5)
WBC # FLD AUTO: 6.59 K/UL — SIGNIFICANT CHANGE UP (ref 3.8–10.5)

## 2022-01-01 PROCEDURE — 99233 SBSQ HOSP IP/OBS HIGH 50: CPT

## 2022-01-01 PROCEDURE — 92928 PRQ TCAT PLMT NTRAC ST 1 LES: CPT | Mod: LD

## 2022-01-01 PROCEDURE — 99233 SBSQ HOSP IP/OBS HIGH 50: CPT | Mod: 25

## 2022-01-01 PROCEDURE — 93306 TTE W/DOPPLER COMPLETE: CPT | Mod: 26

## 2022-01-01 PROCEDURE — 99223 1ST HOSP IP/OBS HIGH 75: CPT | Mod: 25

## 2022-01-01 RX ORDER — INSULIN GLARGINE 100 [IU]/ML
18 INJECTION, SOLUTION SUBCUTANEOUS ONCE
Refills: 0 | Status: COMPLETED | OUTPATIENT
Start: 2022-01-01 | End: 2022-01-01

## 2022-01-01 RX ORDER — METOPROLOL TARTRATE 50 MG
12.5 TABLET ORAL
Refills: 0 | Status: DISCONTINUED | OUTPATIENT
Start: 2022-01-01 | End: 2022-01-02

## 2022-01-01 RX ORDER — INSULIN LISPRO 100/ML
5 VIAL (ML) SUBCUTANEOUS
Refills: 0 | Status: DISCONTINUED | OUTPATIENT
Start: 2022-01-01 | End: 2022-01-01

## 2022-01-01 RX ORDER — INSULIN LISPRO 100/ML
6 VIAL (ML) SUBCUTANEOUS
Refills: 0 | Status: DISCONTINUED | OUTPATIENT
Start: 2022-01-01 | End: 2022-01-02

## 2022-01-01 RX ORDER — INSULIN GLARGINE 100 [IU]/ML
18 INJECTION, SOLUTION SUBCUTANEOUS
Refills: 0 | Status: DISCONTINUED | OUTPATIENT
Start: 2022-01-01 | End: 2022-01-01

## 2022-01-01 RX ORDER — INSULIN GLARGINE 100 [IU]/ML
18 INJECTION, SOLUTION SUBCUTANEOUS
Refills: 0 | Status: DISCONTINUED | OUTPATIENT
Start: 2022-01-02 | End: 2022-01-02

## 2022-01-01 RX ORDER — INSULIN GLARGINE 100 [IU]/ML
16 INJECTION, SOLUTION SUBCUTANEOUS AT BEDTIME
Refills: 0 | Status: DISCONTINUED | OUTPATIENT
Start: 2022-01-01 | End: 2022-01-01

## 2022-01-01 RX ORDER — GLUCAGON INJECTION, SOLUTION 0.5 MG/.1ML
1 INJECTION, SOLUTION SUBCUTANEOUS ONCE
Refills: 0 | Status: DISCONTINUED | OUTPATIENT
Start: 2022-01-01 | End: 2022-01-03

## 2022-01-01 RX ORDER — KETOTIFEN FUMARATE 0.34 MG/ML
1 SOLUTION OPHTHALMIC
Refills: 0 | Status: DISCONTINUED | OUTPATIENT
Start: 2022-01-01 | End: 2022-01-03

## 2022-01-01 RX ADMIN — ATORVASTATIN CALCIUM 80 MILLIGRAM(S): 80 TABLET, FILM COATED ORAL at 21:31

## 2022-01-01 RX ADMIN — Medication 2: at 07:34

## 2022-01-01 RX ADMIN — KETOTIFEN FUMARATE 1 DROP(S): 0.34 SOLUTION OPHTHALMIC at 18:39

## 2022-01-01 RX ADMIN — HEPARIN SODIUM 5000 UNIT(S): 5000 INJECTION INTRAVENOUS; SUBCUTANEOUS at 21:31

## 2022-01-01 RX ADMIN — TICAGRELOR 90 MILLIGRAM(S): 90 TABLET ORAL at 11:04

## 2022-01-01 RX ADMIN — Medication 5 MILLIGRAM(S): at 06:09

## 2022-01-01 RX ADMIN — TICAGRELOR 90 MILLIGRAM(S): 90 TABLET ORAL at 21:31

## 2022-01-01 RX ADMIN — Medication 6 UNIT(S): at 17:05

## 2022-01-01 RX ADMIN — Medication 12.5 MILLIGRAM(S): at 17:04

## 2022-01-01 RX ADMIN — Medication 1 DROP(S): at 18:17

## 2022-01-01 RX ADMIN — INSULIN GLARGINE 18 UNIT(S): 100 INJECTION, SOLUTION SUBCUTANEOUS at 17:29

## 2022-01-01 RX ADMIN — Medication 81 MILLIGRAM(S): at 11:06

## 2022-01-01 RX ADMIN — Medication 5: at 12:10

## 2022-01-01 RX ADMIN — HEPARIN SODIUM 5000 UNIT(S): 5000 INJECTION INTRAVENOUS; SUBCUTANEOUS at 06:09

## 2022-01-01 RX ADMIN — Medication 12.5 MILLIGRAM(S): at 06:09

## 2022-01-01 RX ADMIN — Medication 2: at 17:05

## 2022-01-01 RX ADMIN — Medication 5 MILLIGRAM(S): at 18:39

## 2022-01-01 RX ADMIN — CHLORHEXIDINE GLUCONATE 1 APPLICATION(S): 213 SOLUTION TOPICAL at 21:30

## 2022-01-01 NOTE — PROGRESS NOTE ADULT - ASSESSMENT
67 y/o M PMHx of HTN, DM, prostate ca, reported PCI in past presented to [facility] with stemi and transferred here for intervention. Angiogram on 12/31 with findings of 100% occlusion of RCA s/p DESx3, intracoronary integrelin 19mg given. pLAD 80% occluded. Plan for staged PCI of pLAD lesion. Of note patient with uncontrolled diabetes with endo on board. Patient hemodynamically stable and deemed stable for downgrade from CCU to the floors.

## 2022-01-01 NOTE — CONSULT NOTE ADULT - SUBJECTIVE AND OBJECTIVE BOX
HPI:  66M history of HTN, uncontrolled T2DM c/b CAD, prostate Ca presenting for inferior wall STEMI, sp cath with plan for staging PCI to LAD on 1/3.     Endocrine consulted for uncontrolled T2DM, A1c 11.9%    Diabetes History:   Most recent A1c 11.9%  -Diagnosed   -Endocrinologist  -Current Regimen - Metformin 1000 mg BID  -FS at home  -Diet  -Complications/Diabetes HCM  Micro  Macro - (+) CAD here with STEMI.      PAST MEDICAL & SURGICAL HISTORY:  HTN (hypertension)  Uncontrolled T2DM c/b CAD  Hypercholesterolemia  Sleep apnea  uses CPAP machine at night  Prostate cancer  History of pterygium excision  left eye      FAMILY HISTORY:  Fam hx of T2DM in parent    Social History: No tobacco or alcohol use    Outpatient Medications:  · 	clomiPRAMINE 25 mg oral capsule: Last Dose Taken:  , orally once a day  · 	amLODIPine 5 mg oral tablet: Last Dose Taken:  , 1 tab(s) orally once a day  · 	metFORMIN 500 mg oral tablet: Last Dose Taken:  , 2 tab(s) orally once a day  · 	atorvastatin 40 mg oral tablet: Last Dose Taken:  , 1 tab(s) orally once a day  · 	solifenacin 5 mg oral tablet: 1 tab(s) orally once a day    MEDICATIONS  (STANDING):  aspirin enteric coated 81 milliGRAM(s) Oral daily  atorvastatin 80 milliGRAM(s) Oral at bedtime  chlorhexidine 2% Cloths 1 Application(s) Topical daily  dextrose 40% Gel 15 Gram(s) Oral once  dextrose 5%. 1000 milliLiter(s) (50 mL/Hr) IV Continuous <Continuous>  dextrose 5%. 1000 milliLiter(s) (100 mL/Hr) IV Continuous <Continuous>  dextrose 50% Injectable 25 Gram(s) IV Push once  dextrose 50% Injectable 12.5 Gram(s) IV Push once  dextrose 50% Injectable 25 Gram(s) IV Push once  glucagon  Injectable 1 milliGRAM(s) IntraMuscular once  heparin   Injectable 5000 Unit(s) SubCutaneous every 8 hours  insulin glargine Injectable (LANTUS) 16 Unit(s) SubCutaneous at bedtime  insulin lispro (ADMELOG) corrective regimen sliding scale   SubCutaneous three times a day before meals  insulin lispro (ADMELOG) corrective regimen sliding scale   SubCutaneous at bedtime  insulin lispro Injectable (ADMELOG) 5 Unit(s) SubCutaneous before breakfast  insulin lispro Injectable (ADMELOG) 5 Unit(s) SubCutaneous before lunch  insulin lispro Injectable (ADMELOG) 5 Unit(s) SubCutaneous before dinner  metoprolol tartrate 12.5 milliGRAM(s) Oral two times a day  oxybutynin 5 milliGRAM(s) Oral two times a day  ticagrelor 90 milliGRAM(s) Oral every 12 hours    MEDICATIONS  (PRN):      Allergies    No Known Allergies    Intolerances      Review of Systems:  Constitutional: No fever  Eyes: No blurry vision  Neuro: No tremors  HEENT: No pain  Cardiovascular: No chest pain, palpitations  Respiratory: No SOB, no cough  GI: No nausea, vomiting, abdominal pain  : No dysuria  Skin: no rash  Psych: no depression  Endocrine: no polyuria, polydipsia  Hem/lymph: no swelling  Osteoporosis: no fractures    PHYSICAL EXAM:  VITALS: T(C): 36.6 (01-01-22 @ 11:00)  T(F): 97.9 (01-01-22 @ 11:00), Max: 98.4 (01-01-22 @ 03:00)  HR: 72 (01-01-22 @ 13:00) (58 - 93)  BP: 105/61 (01-01-22 @ 13:00) (97/69 - 138/77)  RR:  (8 - 25)  SpO2:  (93% - 98%)  Wt(kg): --  GENERAL: NAD, well-groomed, well-developed  EYES: No proptosis, no lid lag, anicteric  HEENT:  Atraumatic, Normocephalic, moist mucous membranes  THYROID: Normal size, no palpable nodules  RESPIRATORY: Clear to auscultation bilaterally; No rales, rhonchi, wheezing  CARDIOVASCULAR: Regular rate and rhythm; No murmurs; no peripheral edema  GI: Soft, nontender, non distended, normal bowel sounds  SKIN: Dry, intact, No rashes or lesions  MUSCULOSKELETAL: Full range of motion, normal strength  NEURO: sensation intact, extraocular movements intact, no tremor  PSYCH: Alert and oriented x 3, normal affect, normal mood  CUSHING'S SIGNS: no striae    POCT Blood Glucose.: 376 mg/dL (01-01-22 @ 12:08)  POCT Blood Glucose.: 210 mg/dL (01-01-22 @ 07:31)  POCT Blood Glucose.: 305 mg/dL (12-31-21 @ 22:45)  POCT Blood Glucose.: 243 mg/dL (12-31-21 @ 16:27)                            13.3   6.59  )-----------( 274      ( 01 Jan 2022 00:25 )             40.7       01-01    132<L>  |  99  |  13  ----------------------------<  271<H>  4.0   |  18<L>  |  0.72    EGFR if : 114  EGFR if non : 99    Ca    9.2      01-01  Mg     2.1     01-01  Phos  3.6     01-01    TPro  6.4  /  Alb  3.7  /  TBili  0.3  /  DBili  x   /  AST  36  /  ALT  10  /  AlkPhos  84  01-01      Thyroid Function Tests:  12-31 @ 21:37 TSH 0.09 FreeT4 -- T3 -- Anti TPO -- Anti Thyroglobulin Ab -- TSI --      A1C with Estimated Average Glucose Result: 11.9 % (12-31-21 @ 22:31)      12-31 Chol 191 Direct LDL -- LDL calculated 130<H> HDL 41 Trig 100    Radiology:                HPI:  66M history of HTN, uncontrolled T2DM c/b CAD, prostate Ca presenting for inferior wall STEMI, sp cath with plan for staging PCI to LAD on 1/3.     Endocrine consulted for uncontrolled T2DM, A1c 11.9%    Diabetes History:   Most recent A1c 11.9%  -Diagnosed 10 years ago  -Endocrinologist - none, managed by PCP  -Current Regimen - Metformin 1000 mg BID. He works at night, so takes Lantus 25 units in the AM before he sleeps in the day. He states that if his FS before bed are > 200s, he takes another dose of Lantus 25 units when he wakes up.  -Past regimens - patient has tried Trulicity in the past and lost a lot of weight, so he did not continue taking it   -FS at home - Only takes it before he sleeps, usually 200-300s.   -Diet - average, tries to eat vegetables  -Complications/Diabetes HCM  Micro - No retinopathy, saw optho in the summer. No neuropathy or nephropathy  Macro - (+) CAD here with STEMI.      PAST MEDICAL & SURGICAL HISTORY:  HTN (hypertension)  Uncontrolled T2DM c/b CAD  Hypercholesterolemia  Sleep apnea  uses CPAP machine at night  Prostate cancer  History of pterygium excision  left eye      FAMILY HISTORY:  Fam hx of T2DM in parent    Social History: No tobacco or alcohol use    Outpatient Medications:  · 	clomiPRAMINE 25 mg oral capsule: Last Dose Taken:  , orally once a day  · 	amLODIPine 5 mg oral tablet: Last Dose Taken:  , 1 tab(s) orally once a day  · 	metFORMIN 500 mg oral tablet: Last Dose Taken:  , 2 tab(s) orally once a day  Lantus 25 units 1-2x/day  · 	atorvastatin 40 mg oral tablet: Last Dose Taken:  , 1 tab(s) orally once a day  · 	solifenacin 5 mg oral tablet: 1 tab(s) orally once a day    MEDICATIONS  (STANDING):  aspirin enteric coated 81 milliGRAM(s) Oral daily  atorvastatin 80 milliGRAM(s) Oral at bedtime  chlorhexidine 2% Cloths 1 Application(s) Topical daily  dextrose 40% Gel 15 Gram(s) Oral once  dextrose 5%. 1000 milliLiter(s) (50 mL/Hr) IV Continuous <Continuous>  dextrose 5%. 1000 milliLiter(s) (100 mL/Hr) IV Continuous <Continuous>  dextrose 50% Injectable 25 Gram(s) IV Push once  dextrose 50% Injectable 12.5 Gram(s) IV Push once  dextrose 50% Injectable 25 Gram(s) IV Push once  glucagon  Injectable 1 milliGRAM(s) IntraMuscular once  heparin   Injectable 5000 Unit(s) SubCutaneous every 8 hours  insulin glargine Injectable (LANTUS) 16 Unit(s) SubCutaneous at bedtime  insulin lispro (ADMELOG) corrective regimen sliding scale   SubCutaneous three times a day before meals  insulin lispro (ADMELOG) corrective regimen sliding scale   SubCutaneous at bedtime  insulin lispro Injectable (ADMELOG) 5 Unit(s) SubCutaneous before breakfast  insulin lispro Injectable (ADMELOG) 5 Unit(s) SubCutaneous before lunch  insulin lispro Injectable (ADMELOG) 5 Unit(s) SubCutaneous before dinner  metoprolol tartrate 12.5 milliGRAM(s) Oral two times a day  oxybutynin 5 milliGRAM(s) Oral two times a day  ticagrelor 90 milliGRAM(s) Oral every 12 hours    MEDICATIONS  (PRN):      Allergies    No Known Allergies    Intolerances      Review of Systems:  Constitutional: No fever  Eyes: No blurry vision  Neuro: No tremors  HEENT: No pain  Cardiovascular: No chest pain, palpitations  Respiratory: No SOB, no cough  GI: No nausea, vomiting, abdominal pain  : No dysuria  Skin: no rash  Psych: no depression  Endocrine: no polyuria, polydipsia  Hem/lymph: no swelling  Osteoporosis: no fractures    PHYSICAL EXAM:  VITALS: T(C): 36.6 (01-01-22 @ 11:00)  T(F): 97.9 (01-01-22 @ 11:00), Max: 98.4 (01-01-22 @ 03:00)  HR: 72 (01-01-22 @ 13:00) (58 - 93)  BP: 105/61 (01-01-22 @ 13:00) (97/69 - 138/77)  RR:  (8 - 25)  SpO2:  (93% - 98%)  Wt(kg): --  GENERAL: NAD, obese  THYROID: Normal size, no palpable nodules  RESPIRATORY: Clear to auscultation bilaterally; No rales, rhonchi, wheezing  CARDIOVASCULAR: Regular rate and rhythm; No murmurs; no peripheral edema  EXT: b/l feet wnl  PSYCH: Alert and oriented x 3, reactive mood    POCT Blood Glucose.: 376 mg/dL (01-01-22 @ 12:08)  POCT Blood Glucose.: 210 mg/dL (01-01-22 @ 07:31)  POCT Blood Glucose.: 305 mg/dL (12-31-21 @ 22:45)  POCT Blood Glucose.: 243 mg/dL (12-31-21 @ 16:27)                            13.3   6.59  )-----------( 274      ( 01 Jan 2022 00:25 )             40.7       01-01    132<L>  |  99  |  13  ----------------------------<  271<H>  4.0   |  18<L>  |  0.72    EGFR if : 114  EGFR if non : 99    Ca    9.2      01-01  Mg     2.1     01-01  Phos  3.6     01-01    TPro  6.4  /  Alb  3.7  /  TBili  0.3  /  DBili  x   /  AST  36  /  ALT  10  /  AlkPhos  84  01-01      Thyroid Function Tests:  12-31 @ 21:37 TSH 0.09 FreeT4 -- T3 -- Anti TPO -- Anti Thyroglobulin Ab -- TSI --      A1C with Estimated Average Glucose Result: 11.9 % (12-31-21 @ 22:31)      12-31 Chol 191 Direct LDL -- LDL calculated 130<H> HDL 41 Trig 100    Radiology:

## 2022-01-01 NOTE — CHART NOTE - NSCHARTNOTEFT_GEN_A_CORE
CCU Transfer Note    Transfer from: CCU    Transfer to: (  ) Medicine    (  ) Telemetry    (  ) RCU (  ) Palliative    (  ) Stroke Unit    (  ) MICU    (  ) __________________    Accepting Physician:    Signout given to:     HPI / CCU COURSE:    66M history of HTN, DM, prostate ca, reported PCI in past (states he had 2 stents placed 5 years ago at [facility], but not in records, states he was told he no longer needed blood thinners) who presented to  with CP for 1 day CP woke him up from sleep 12/30 5AM, associated with R arm discomfort. No diaphoresis or SOB or n/v. CP became progressive worse from 6/10- 8/10 so he presented ot the ED. ECG showing SUJIT in inferior leads. VS at ED: T 98, -99, /87, satting well on RA. EKG c/w STEMI with NSR, rate 99, LAE, SUJIT inferior leads, ST depression aVL, narrow QRS. Transferred to NS for emergent cath.  Loaded with ASA+Brilinta, Atorvastatin, and heparin bolus at . 3/10 CP on arrival hereNo fever, chills, SOB, cough, abd pain, n/v, diarrhea. COVID vax x3, booster about 1 wk ago.    Pt went for cath 12/31 with findings of 100% occlusion of RCA s/p DESx3, intracoronary integrelin 19mg given. pLAD 80% occluded. Also given 1 of versed, 25 of fentanyl and 6000U heparin bolus. LVEDP 8-12, ventriculogram EF 40%, given 500cc bolus for hypotension and improved. Post procedure, CP improved to 1/10. Patient is planned for proximal LAD staging today.     The patients trops have trended from 124--> 918 --> 616 and lactate 2.9 --> 2.0. LVEDP 8-12, ventriculogram showed EF 40%. The patient was given 500cc bolus during cath for hypotension with improvement and has been hemodynamically stable since then.    The patient was found to have a TSH of 0.09 and pending free T4, his a1c was 11.9 (taking metformin 1000qd at home) and started on lantus 16 and premeal 5 w/ sliding scale as he is insulin naive, this can be titrated up as needed during his stay.     The patient is currently hemodynamically stable to be downgraded to the floors      Vital Signs Last 24 Hrs  T(C): 36.6 (01 Jan 2022 11:00), Max: 37.1 (31 Dec 2021 14:30)  T(F): 97.9 (01 Jan 2022 11:00), Max: 98.7 (31 Dec 2021 14:30)  HR: 62 (01 Jan 2022 12:00) (58 - 99)  BP: 119/76 (01 Jan 2022 12:00) (97/69 - 138/77)  BP(mean): 94 (01 Jan 2022 12:00) (79 - 102)  RR: 15 (01 Jan 2022 12:00) (8 - 25)  SpO2: 98% (01 Jan 2022 12:00) (93% - 100%)    I&O's Summary    31 Dec 2021 07:01  -  01 Jan 2022 07:00  --------------------------------------------------------  IN: 810 mL / OUT: 1180 mL / NET: -370 mL    01 Jan 2022 07:01  -  01 Jan 2022 12:07  --------------------------------------------------------  IN: 240 mL / OUT: 300 mL / NET: -60 mL        Physical Exam:       LABS:   CARDIAC MARKERS ( 01 Jan 2022 06:08 )  x     / x     / 399 U/L / x     / 26.0 ng/mL  CARDIAC MARKERS ( 01 Jan 2022 00:25 )  x     / x     / 437 U/L / x     / 35.3 ng/mL  CARDIAC MARKERS ( 31 Dec 2021 18:01 )  x     / x     / 412 U/L / x     / 38.1 ng/mL                              13.3   6.59  )-----------( 274      ( 01 Jan 2022 00:25 )             40.7       01-01    132<L>  |  99  |  13  ----------------------------<  271<H>  4.0   |  18<L>  |  0.72    Ca    9.2      01 Jan 2022 00:25  Phos  3.6     01-01  Mg     2.1     01-01    TPro  6.4  /  Alb  3.7  /  TBili  0.3  /  DBili  x   /  AST  36  /  ALT  10  /  AlkPhos  84  01-01      PT/INR - ( 31 Dec 2021 17:57 )   PT: 12.2 sec;   INR: 1.02 ratio         PTT - ( 31 Dec 2021 17:57 )  PTT:49.0 sec          ECG:    Telemetry:    Imaging:      ASSESSMENT & PLAN:   66M history of HTN, DM, prostate ca, SHERRY (qhs CPAP) reported PCI in past (?) who presented to  with CP since yesterday, found to have IWSTEMI, load/brillinta txfer to [facility] for cath (12/31),  s/p 100% occlusion of RCA s/p DESx3, LAD 80%, plan for staging to LAD on Monday.    #Neuro:  -no active issue    #Cardiovascular:  -IWSTEMI  -s/p ASA brillinta atorva load at   -c/w ASA 81 qd, brillinta 90 BID, atorva 80 qd  - s/p PCI KINDRA X3 to %, pLAD 70% plan for staging to LAD today  - TropI 124   - Trops 918 --> 616  - Lactate 2.9 --> 2.0   - lipid panel chol 191, , nonHDL 150  - f/up TTE tomorrow    -HFreEF  LVEDP 8-12, ventriculogram EF 40%  s/p 500 cc bolus in cath lab for hypotension    #Pulmonary:  -on RA   -hx of SHERRY on CPAP, c/w home CPAP  - encourage incentive spirometry    #FEN/GI:  -DASH/TLC diet    #Renal:  -no active issue    #:  -no active issue  -uses clomipramine PRN at home for intercourse    #ID:  -no active issue  COVID-19 PCR negative.    #Heme:  -DVT ppx: Hep subc    #Endo:  - TSH 0.09  - f/up free t4  - a1c 11.9, on metformin 1000 qd at home  - starting 16 lantus and 5 premeal as pt is insulin naive   -ISS  -diabetic diet    FOR FOLLOW UP:  [ ] TTE  [ ] Free t4  [ ] Insulin adjustment as needed + diabetes education CCU Transfer Note    Transfer from: CCU    Transfer to: ( x) Medicine    (  ) Telemetry    (  ) RCU (  ) Palliative    (  ) Stroke Unit    (  ) MICU    (  ) __________________    Accepting Physician: Dr. Sandro Trotter    Signout given to:     HPI / CCU COURSE:    66M history of HTN, DM, prostate ca, reported PCI in past (states he had 2 stents placed 5 years ago at [facility], but not in records, states he was told he no longer needed blood thinners) who presented to  with CP for 1 day CP woke him up from sleep 12/30 5AM, associated with R arm discomfort. No diaphoresis or SOB or n/v. CP became progressive worse from 6/10- 8/10 so he presented ot the ED. ECG showing SUJIT in inferior leads. VS at ED: T 98, -99, /87, satting well on RA. EKG c/w STEMI with NSR, rate 99, LAE, SUJIT inferior leads, ST depression aVL, narrow QRS. Transferred to NS for emergent cath.  Loaded with ASA+Brilinta, Atorvastatin, and heparin bolus at . 3/10 CP on arrival hereNo fever, chills, SOB, cough, abd pain, n/v, diarrhea. COVID vax x3, booster about 1 wk ago.    Pt went for cath 12/31 with findings of 100% occlusion of RCA s/p DESx3, intracoronary integrelin 19mg given. pLAD 80% occluded. Also given 1 of versed, 25 of fentanyl and 6000U heparin bolus. LVEDP 8-12, ventriculogram EF 40%, given 500cc bolus for hypotension and improved. Post procedure, CP improved to 1/10. Patient is planned for proximal LAD staging today.     The patients trops have trended from 124--> 918 --> 616 and lactate 2.9 --> 2.0. LVEDP 8-12, ventriculogram showed EF 40%. The patient was given 500cc bolus during cath for hypotension with improvement and has been hemodynamically stable since then.    The patient was found to have a TSH of 0.09 and pending free T4, his a1c was 11.9 (taking metformin 1000qd at home) and started on lantus 16 and premeal 5 w/ sliding scale as he is insulin naive, this can be titrated up as needed during his stay.     The patient is currently hemodynamically stable to be downgraded to the floors      Vital Signs Last 24 Hrs  T(C): 36.6 (01 Jan 2022 11:00), Max: 37.1 (31 Dec 2021 14:30)  T(F): 97.9 (01 Jan 2022 11:00), Max: 98.7 (31 Dec 2021 14:30)  HR: 62 (01 Jan 2022 12:00) (58 - 99)  BP: 119/76 (01 Jan 2022 12:00) (97/69 - 138/77)  BP(mean): 94 (01 Jan 2022 12:00) (79 - 102)  RR: 15 (01 Jan 2022 12:00) (8 - 25)  SpO2: 98% (01 Jan 2022 12:00) (93% - 100%)    I&O's Summary    31 Dec 2021 07:01  -  01 Jan 2022 07:00  --------------------------------------------------------  IN: 810 mL / OUT: 1180 mL / NET: -370 mL    01 Jan 2022 07:01  -  01 Jan 2022 12:07  --------------------------------------------------------  IN: 240 mL / OUT: 300 mL / NET: -60 mL        Physical Exam:   GENERAL APPEARANCE: Well developed, NAD  HEENT:  PERRL, EOMI. hearing grossly intact.  NECK: Neck supple, non-tender no lymphadenopathy, masses or thyromegaly.  CARDIAC: Normal S1 and S2. no mrg. RRR  LUNGS: Clear to auscultation B/L, no rales, rhonchi, or wheezing  ABDOMEN: Soft , NTND, bowel sounds normal. No guarding or rebound.   MUSCULOSKELETAL: ROM intact.  No joint erythema or tenderness.   EXTREMITIES: No edema. Peripheral pulses intact.   NEUROLOGICAL: Non focal. Strength and sensation symmetric and intact throughout.   SKIN: Warm and dry , Well perfused  PSYCHIATRIC: AOx3 , Normal mood and affect      LABS:   CARDIAC MARKERS ( 01 Jan 2022 06:08 )  x     / x     / 399 U/L / x     / 26.0 ng/mL  CARDIAC MARKERS ( 01 Jan 2022 00:25 )  x     / x     / 437 U/L / x     / 35.3 ng/mL  CARDIAC MARKERS ( 31 Dec 2021 18:01 )  x     / x     / 412 U/L / x     / 38.1 ng/mL                              13.3   6.59  )-----------( 274      ( 01 Jan 2022 00:25 )             40.7       01-01    132<L>  |  99  |  13  ----------------------------<  271<H>  4.0   |  18<L>  |  0.72    Ca    9.2      01 Jan 2022 00:25  Phos  3.6     01-01  Mg     2.1     01-01    TPro  6.4  /  Alb  3.7  /  TBili  0.3  /  DBili  x   /  AST  36  /  ALT  10  /  AlkPhos  84  01-01      PT/INR - ( 31 Dec 2021 17:57 )   PT: 12.2 sec;   INR: 1.02 ratio         PTT - ( 31 Dec 2021 17:57 )  PTT:49.0 sec          ASSESSMENT & PLAN:   66M history of HTN, DM, prostate ca, SHERRY (qhs CPAP) reported PCI in past (?) who presented to  with CP since yesterday, found to have IWSTEMI, load/brillinta txfer to [facility] for cath (12/31),  s/p 100% occlusion of RCA s/p DESx3, LAD 80%, plan for staging to LAD on Monday.    #Neuro:  -no active issue    #Cardiovascular:  -IWSTEMI  -s/p ASA brillinta atorva load at   -c/w ASA 81 qd, brillinta 90 BID, atorva 80 qd  - s/p PCI KINDRA X3 to %, pLAD 70% plan for staging to LAD today  - TropI 124   - Trops 918 --> 616  - Lactate 2.9 --> 2.0   - lipid panel chol 191, , nonHDL 150  - f/up TTE tomorrow    -HFreEF  LVEDP 8-12, ventriculogram EF 40%  s/p 500 cc bolus in cath lab for hypotension    #Pulmonary:  -on RA   -hx of SHERRY on CPAP, c/w home CPAP  - encourage incentive spirometry    #FEN/GI:  -DASH/TLC diet    #Renal:  -no active issue    #:  -no active issue  -uses clomipramine PRN at home for intercourse    #ID:  -no active issue  COVID-19 PCR negative.    #Heme:  -DVT ppx: Hep subc    #Endo:  - TSH 0.09  - f/up free t4  - a1c 11.9, on metformin 1000 qd at home  - starting 16 lantus and 5 premeal as pt is insulin naive   -ISS  -diabetic diet    FOR FOLLOW UP:  [ ] TTE  [ ] Free t4  [ ] Insulin adjustment as needed + diabetes education CCU Transfer Note    Transfer from: CCU    Transfer to: ( x) Medicine    (  ) Telemetry    (  ) RCU (  ) Palliative    (  ) Stroke Unit    (  ) MICU    (  ) __________________    Accepting Physician: Dr. Sandro Trotter    Signout given to:     HPI / CCU COURSE:    66M history of HTN, DM, prostate ca, reported PCI in past (states he had 2 stents placed 5 years ago at [facility], but not in records, states he was told he no longer needed blood thinners) who presented to  with CP for 1 day CP woke him up from sleep 12/30 5AM, associated with R arm discomfort. No diaphoresis or SOB or n/v. CP became progressive worse from 6/10- 8/10 so he presented ot the ED. ECG showing SUJIT in inferior leads. VS at ED: T 98, -99, /87, satting well on RA. EKG c/w STEMI with NSR, rate 99, LAE, SUJIT inferior leads, ST depression aVL, narrow QRS. Transferred to NS for emergent cath.  Loaded with ASA+Brilinta, Atorvastatin, and heparin bolus at . 3/10 CP on arrival hereNo fever, chills, SOB, cough, abd pain, n/v, diarrhea. COVID vax x3, booster about 1 wk ago.    Pt went for cath 12/31 with findings of 100% occlusion of RCA s/p DESx3, intracoronary integrelin 19mg given. pLAD 80% occluded. Also given 1 of versed, 25 of fentanyl and 6000U heparin bolus. LVEDP 8-12, ventriculogram EF 40%, given 500cc bolus for hypotension and improved. Post procedure, CP improved to 1/10. Patient is planned for proximal LAD staging today.     The patients trops have trended from 124--> 918 --> 616 and lactate 2.9 --> 2.0. LVEDP 8-12, ventriculogram showed EF 40%.    The patient was found to have a TSH of 0.09 and pending free T4, his a1c was 11.9 (taking metformin 1000qd at home) and started on lantus 16 and premeal 5 w/ sliding scale as he is insulin naive, this can be titrated up as needed during his stay.     The patient is currently hemodynamically stable to be downgraded to the floors      Vital Signs Last 24 Hrs  T(C): 36.6 (01 Jan 2022 11:00), Max: 37.1 (31 Dec 2021 14:30)  T(F): 97.9 (01 Jan 2022 11:00), Max: 98.7 (31 Dec 2021 14:30)  HR: 62 (01 Jan 2022 12:00) (58 - 99)  BP: 119/76 (01 Jan 2022 12:00) (97/69 - 138/77)  BP(mean): 94 (01 Jan 2022 12:00) (79 - 102)  RR: 15 (01 Jan 2022 12:00) (8 - 25)  SpO2: 98% (01 Jan 2022 12:00) (93% - 100%)    I&O's Summary    31 Dec 2021 07:01  -  01 Jan 2022 07:00  --------------------------------------------------------  IN: 810 mL / OUT: 1180 mL / NET: -370 mL    01 Jan 2022 07:01  -  01 Jan 2022 12:07  --------------------------------------------------------  IN: 240 mL / OUT: 300 mL / NET: -60 mL        Physical Exam:   GENERAL APPEARANCE: Well developed, NAD  HEENT:  PERRL, EOMI. hearing grossly intact.  NECK: Neck supple, non-tender no lymphadenopathy, masses or thyromegaly.  CARDIAC: Normal S1 and S2. no mrg. RRR  LUNGS: Clear to auscultation B/L, no rales, rhonchi, or wheezing  ABDOMEN: Soft , NTND, bowel sounds normal. No guarding or rebound.   MUSCULOSKELETAL: ROM intact.  No joint erythema or tenderness.   EXTREMITIES: No edema. Peripheral pulses intact.   NEUROLOGICAL: Non focal. Strength and sensation symmetric and intact throughout.   SKIN: Warm and dry , Well perfused  PSYCHIATRIC: AOx3 , Normal mood and affect      LABS:   CARDIAC MARKERS ( 01 Jan 2022 06:08 )  x     / x     / 399 U/L / x     / 26.0 ng/mL  CARDIAC MARKERS ( 01 Jan 2022 00:25 )  x     / x     / 437 U/L / x     / 35.3 ng/mL  CARDIAC MARKERS ( 31 Dec 2021 18:01 )  x     / x     / 412 U/L / x     / 38.1 ng/mL                              13.3   6.59  )-----------( 274      ( 01 Jan 2022 00:25 )             40.7       01-01    132<L>  |  99  |  13  ----------------------------<  271<H>  4.0   |  18<L>  |  0.72    Ca    9.2      01 Jan 2022 00:25  Phos  3.6     01-01  Mg     2.1     01-01    TPro  6.4  /  Alb  3.7  /  TBili  0.3  /  DBili  x   /  AST  36  /  ALT  10  /  AlkPhos  84  01-01      PT/INR - ( 31 Dec 2021 17:57 )   PT: 12.2 sec;   INR: 1.02 ratio         PTT - ( 31 Dec 2021 17:57 )  PTT:49.0 sec          ASSESSMENT & PLAN:   66M history of HTN, DM, prostate ca, SHERRY (qhs CPAP) reported PCI in past (?) who presented to  with CP since yesterday, found to have IWSTEMI, load/brillinta txfer to [facility] for cath (12/31),  s/p 100% occlusion of RCA s/p DESx3, LAD 80%, plan for staging to LAD on Monday.    #Neuro:  -no active issue    #Cardiovascular:  -IWSTEMI  -s/p ASA brillinta atorva load at   -c/w ASA 81 qd, brillinta 90 BID, atorva 80 qd  - s/p PCI KINDRA X3 to %, pLAD 70% plan for staging to LAD today  - TropI 124   - Trops 918 --> 616  - Lactate 2.9 --> 2.0   - lipid panel chol 191, , nonHDL 150  - f/up TTE tomorrow    -HFreEF  LVEDP 8-12, ventriculogram EF 40%  s/p 500 cc bolus in cath lab for hypotension    #Pulmonary:  -on RA   -hx of SHERRY on CPAP, c/w home CPAP  - encourage incentive spirometry    #FEN/GI:  -DASH/TLC diet    #Renal:  -no active issue    #:  -no active issue  -uses clomipramine PRN at home for intercourse    #ID:  -no active issue  COVID-19 PCR negative.    #Heme:  -DVT ppx: Hep subc    #Endo:  - TSH 0.09  - f/up free t4  - a1c 11.9, on metformin 1000 qd at home  - starting 16 lantus and 5 premeal as pt is insulin naive   -ISS  -diabetic diet    FOR FOLLOW UP:  [ ] TTE  [ ] Free t4  [ ] Insulin adjustment as needed + diabetes education CCU Transfer Note    Transfer from: CCU    Transfer to: ( x) Medicine    (  ) Telemetry    (  ) RCU (  ) Palliative    (  ) Stroke Unit    (  ) MICU      [id number]    Accepting Physician: Dr. Sandro Trotter    Signout given to:     HPI / CCU COURSE:    66M history of HTN, DM, prostate ca, reported PCI in past (states he had 2 stents placed 5 years ago at [facility], but not in records, states he was told he no longer needed blood thinners) who presented to  with CP for 1 day CP woke him up from sleep 12/30 5AM, associated with R arm discomfort. No diaphoresis or SOB or n/v. CP became progressive worse from 6/10- 8/10 so he presented ot the ED. ECG showing SUJIT in inferior leads. VS at ED: T 98, -99, /87, satting well on RA. EKG c/w STEMI with NSR, rate 99, LAE, SUJIT inferior leads, ST depression aVL, narrow QRS. Transferred to NS for emergent cath.  Loaded with ASA+Brilinta, Atorvastatin, and heparin bolus at . 3/10 CP on arrival hereNo fever, chills, SOB, cough, abd pain, n/v, diarrhea. COVID vax x3, booster about 1 wk ago.    Pt went for cath 12/31 with findings of 100% occlusion of RCA s/p DESx3, intracoronary integrelin 19mg given. pLAD 80% occluded. Also given 1 of versed, 25 of fentanyl and 6000U heparin bolus. LVEDP 8-12, ventriculogram EF 40%, given 500cc bolus for hypotension and improved. Post procedure, CP improved to 1/10. Patient is planned for proximal LAD staging today.     The patients trops have trended from 124--> 918 --> 616 and lactate 2.9 --> 2.0. LVEDP 8-12, ventriculogram showed EF 40%.    The patient was found to have a TSH of 0.09 and pending free T4, his a1c was 11.9 (taking metformin 1000qd at home) and started on lantus 16 and premeal 5 w/ sliding scale as he is insulin naive, this can be titrated up as needed during his stay.     The patient is currently hemodynamically stable to be downgraded to the floors      Vital Signs Last 24 Hrs  T(C): 36.6 (01 Jan 2022 11:00), Max: 37.1 (31 Dec 2021 14:30)  T(F): 97.9 (01 Jan 2022 11:00), Max: 98.7 (31 Dec 2021 14:30)  HR: 62 (01 Jan 2022 12:00) (58 - 99)  BP: 119/76 (01 Jan 2022 12:00) (97/69 - 138/77)  BP(mean): 94 (01 Jan 2022 12:00) (79 - 102)  RR: 15 (01 Jan 2022 12:00) (8 - 25)  SpO2: 98% (01 Jan 2022 12:00) (93% - 100%)    I&O's Summary    31 Dec 2021 07:01  -  01 Jan 2022 07:00  --------------------------------------------------------  IN: 810 mL / OUT: 1180 mL / NET: -370 mL    01 Jan 2022 07:01  -  01 Jan 2022 12:07  --------------------------------------------------------  IN: 240 mL / OUT: 300 mL / NET: -60 mL        Physical Exam:   GENERAL APPEARANCE: Well developed, NAD  HEENT:  PERRL, EOMI. hearing grossly intact.  NECK: Neck supple, non-tender no lymphadenopathy, masses or thyromegaly.  CARDIAC: Normal S1 and S2. no mrg. RRR  LUNGS: Clear to auscultation B/L, no rales, rhonchi, or wheezing  ABDOMEN: Soft , NTND, bowel sounds normal. No guarding or rebound.   MUSCULOSKELETAL: ROM intact.  No joint erythema or tenderness.   EXTREMITIES: No edema. Peripheral pulses intact.   NEUROLOGICAL: Non focal. Strength and sensation symmetric and intact throughout.   SKIN: Warm and dry , Well perfused  PSYCHIATRIC: AOx3 , Normal mood and affect      LABS:   CARDIAC MARKERS ( 01 Jan 2022 06:08 )  x     / x     / 399 U/L / x     / 26.0 ng/mL  CARDIAC MARKERS ( 01 Jan 2022 00:25 )  x     / x     / 437 U/L / x     / 35.3 ng/mL  CARDIAC MARKERS ( 31 Dec 2021 18:01 )  x     / x     / 412 U/L / x     / 38.1 ng/mL                              13.3   6.59  )-----------( 274      ( 01 Jan 2022 00:25 )             40.7       01-01    132<L>  |  99  |  13  ----------------------------<  271<H>  4.0   |  18<L>  |  0.72    Ca    9.2      01 Jan 2022 00:25  Phos  3.6     01-01  Mg     2.1     01-01    TPro  6.4  /  Alb  3.7  /  TBili  0.3  /  DBili  x   /  AST  36  /  ALT  10  /  AlkPhos  84  01-01      PT/INR - ( 31 Dec 2021 17:57 )   PT: 12.2 sec;   INR: 1.02 ratio         PTT - ( 31 Dec 2021 17:57 )  PTT:49.0 sec          ASSESSMENT & PLAN:   66M history of HTN, DM, prostate ca, SHERRY (qhs CPAP) reported PCI in past (?) who presented to  with CP since yesterday, found to have IWSTEMI, load/brillinta txfer to [facility] for cath (12/31),  s/p 100% occlusion of RCA s/p DESx3, LAD 80%, plan for staging to LAD on Monday.    #Neuro:  -no active issue    #Cardiovascular:  -IWSTEMI  -s/p ASA brillinta atorva load at   -c/w ASA 81 qd, brillinta 90 BID, atorva 80 qd  - s/p PCI KNIDRA X3 to %, pLAD 70% plan for staging to LAD today  - TropI 124   - Trops 918 --> 616  - Lactate 2.9 --> 2.0   - lipid panel chol 191, , nonHDL 150  - f/up TTE tomorrow    -HFreEF  LVEDP 8-12, ventriculogram EF 40%  s/p 500 cc bolus in cath lab for hypotension    #Pulmonary:  -on RA   -hx of SHERRY on CPAP, c/w home CPAP  - encourage incentive spirometry    #FEN/GI:  -DASH/TLC diet    #Renal:  -no active issue    #:  -no active issue  -uses clomipramine PRN at home for intercourse    #ID:  -no active issue  COVID-19 PCR negative.    #Heme:  -DVT ppx: Hep subc    #Endo:  - TSH 0.09  - f/up free t4  - a1c 11.9, on metformin 1000 qd at home  - starting 16 lantus and 5 premeal as pt is insulin naive   -ISS  -diabetic diet    FOR FOLLOW UP:  [ ] TTE s/p cath  [ ] Endo consult  [ ] Free t4  [ ] Insulin adjustment as needed + diabetes education CCU Transfer Note    Transfer from: CCU    Transfer to: ( x) Medicine    (  ) Telemetry    (  ) RCU (  ) Palliative    (  ) Stroke Unit    (  ) MICU      [id number]    Accepting Physician: Dr. Sandro Trotter    Signout given to: Kimberly Griffith ACP    HPI / CCU COURSE:    66M history of HTN, DM, prostate ca, reported PCI in past (states he had 2 stents placed 5 years ago at [facility], but not in records, states he was told he no longer needed blood thinners) who presented to  with CP for 1 day CP woke him up from sleep 12/30 5AM, associated with R arm discomfort. No diaphoresis or SOB or n/v. CP became progressive worse from 6/10- 8/10 so he presented ot the ED. ECG showing SUJIT in inferior leads. VS at ED: T 98, -99, /87, satting well on RA. EKG c/w STEMI with NSR, rate 99, LAE, SUJIT inferior leads, ST depression aVL, narrow QRS. Transferred to NS for emergent cath.  Loaded with ASA+Brilinta, Atorvastatin, and heparin bolus at . 3/10 CP on arrival hereNo fever, chills, SOB, cough, abd pain, n/v, diarrhea. COVID vax x3, booster about 1 wk ago.    Pt went for cath 12/31 with findings of 100% occlusion of RCA s/p DESx3, intracoronary integrelin 19mg given. pLAD 80% occluded. Also given 1 of versed, 25 of fentanyl and 6000U heparin bolus. LVEDP 8-12, ventriculogram EF 40%, given 500cc bolus for hypotension and improved. Post procedure, CP improved to 1/10. Patient is planned for proximal LAD staging today.     The patients trops have trended from 124--> 918 --> 616 and lactate 2.9 --> 2.0. LVEDP 8-12, ventriculogram showed EF 40%.    The patient was found to have a TSH of 0.09 and pending free T4, his a1c was 11.9 (taking metformin 1000qd at home) and started on lantus 16 and premeal 5 w/ sliding scale as he is insulin naive, this can be titrated up as needed during his stay.     The patient is currently hemodynamically stable to be downgraded to the floors      Vital Signs Last 24 Hrs  T(C): 36.6 (01 Jan 2022 11:00), Max: 37.1 (31 Dec 2021 14:30)  T(F): 97.9 (01 Jan 2022 11:00), Max: 98.7 (31 Dec 2021 14:30)  HR: 62 (01 Jan 2022 12:00) (58 - 99)  BP: 119/76 (01 Jan 2022 12:00) (97/69 - 138/77)  BP(mean): 94 (01 Jan 2022 12:00) (79 - 102)  RR: 15 (01 Jan 2022 12:00) (8 - 25)  SpO2: 98% (01 Jan 2022 12:00) (93% - 100%)    I&O's Summary    31 Dec 2021 07:01  -  01 Jan 2022 07:00  --------------------------------------------------------  IN: 810 mL / OUT: 1180 mL / NET: -370 mL    01 Jan 2022 07:01  -  01 Jan 2022 12:07  --------------------------------------------------------  IN: 240 mL / OUT: 300 mL / NET: -60 mL        Physical Exam:   GENERAL APPEARANCE: Well developed, NAD  HEENT:  PERRL, EOMI. hearing grossly intact.  NECK: Neck supple, non-tender no lymphadenopathy, masses or thyromegaly.  CARDIAC: Normal S1 and S2. no mrg. RRR  LUNGS: Clear to auscultation B/L, no rales, rhonchi, or wheezing  ABDOMEN: Soft , NTND, bowel sounds normal. No guarding or rebound.   MUSCULOSKELETAL: ROM intact.  No joint erythema or tenderness.   EXTREMITIES: No edema. Peripheral pulses intact.   NEUROLOGICAL: Non focal. Strength and sensation symmetric and intact throughout.   SKIN: Warm and dry , Well perfused  PSYCHIATRIC: AOx3 , Normal mood and affect      LABS:   CARDIAC MARKERS ( 01 Jan 2022 06:08 )  x     / x     / 399 U/L / x     / 26.0 ng/mL  CARDIAC MARKERS ( 01 Jan 2022 00:25 )  x     / x     / 437 U/L / x     / 35.3 ng/mL  CARDIAC MARKERS ( 31 Dec 2021 18:01 )  x     / x     / 412 U/L / x     / 38.1 ng/mL                              13.3   6.59  )-----------( 274      ( 01 Jan 2022 00:25 )             40.7       01-01    132<L>  |  99  |  13  ----------------------------<  271<H>  4.0   |  18<L>  |  0.72    Ca    9.2      01 Jan 2022 00:25  Phos  3.6     01-01  Mg     2.1     01-01    TPro  6.4  /  Alb  3.7  /  TBili  0.3  /  DBili  x   /  AST  36  /  ALT  10  /  AlkPhos  84  01-01      PT/INR - ( 31 Dec 2021 17:57 )   PT: 12.2 sec;   INR: 1.02 ratio         PTT - ( 31 Dec 2021 17:57 )  PTT:49.0 sec          ASSESSMENT & PLAN:   66M history of HTN, DM, prostate ca, SHERRY (qhs CPAP) reported PCI in past (?) who presented to  with CP since yesterday, found to have IWSTEMI, load/brillinta txfer to [facility] for cath (12/31),  s/p 100% occlusion of RCA s/p DESx3, LAD 80%, plan for staging to LAD on Monday.    #Neuro:  -no active issue    #Cardiovascular:  -IWSTEMI  -s/p ASA brillinta atorva load at   -c/w ASA 81 qd, brillinta 90 BID, atorva 80 qd  - s/p PCI KINDRA X3 to %, pLAD 70% plan for staging to LAD today  - TropI 124   - Trops 918 --> 616  - Lactate 2.9 --> 2.0   - lipid panel chol 191, , nonHDL 150  - f/up TTE tomorrow    -HFreEF  LVEDP 8-12, ventriculogram EF 40%  s/p 500 cc bolus in cath lab for hypotension    #Pulmonary:  -on RA   -hx of SHERRY on CPAP, c/w home CPAP  - encourage incentive spirometry    #FEN/GI:  -DASH/TLC diet    #Renal:  -no active issue    #:  -no active issue  -uses clomipramine PRN at home for intercourse    #ID:  -no active issue  COVID-19 PCR negative.    #Heme:  -DVT ppx: Hep subc    #Endo:  - TSH 0.09  - f/up free t4  - a1c 11.9, on metformin 1000 qd at home  - starting 16 lantus and 5 premeal as pt is insulin naive   -ISS  -diabetic diet    FOR FOLLOW UP:  [ ] TTE s/p cath  [ ] Endo consult (called answering service)  [ ] Free t4  [ ] Insulin adjustment as needed + diabetes education

## 2022-01-01 NOTE — CHART NOTE - NSCHARTNOTEFT_GEN_A_CORE
MAR Accept Note  Transfer to:  Medicine   Accepting Attending Physician:  Dr. Trotter   Assigned Room:  [facility] 264W    Patient seen and examined.   Labs and data reviewed.   No findings precluding transfer of service.       HPI/ ICU COURSE:   Please refer to ICU transfer note for full details. Briefly, this is a 66M history of HTN, DM, prostate ca, reported PCI in past (states he had 2 stents placed 5 years ago at [facility], but not in records, states he was told he no longer needed blood thinners) who presented to  with CP for 1 day CP woke him up from sleep 12/30 5AM, associated with R arm discomfort. No diaphoresis or SOB or n/v. CP became progressive worse from 6/10- 8/10 so he presented ot the ED. ECG showing SUJIT in inferior leads. VS at ED: T 98, -99, /87, satting well on RA. EKG c/w STEMI with NSR, rate 99, LAE, SUJIT inferior leads, ST depression aVL, narrow QRS. Transferred to NS for emergent cath.  Loaded with ASA+Brilinta, Atorvastatin, and heparin bolus at . 3/10 CP on arrival hereNo fever, chills, SOB, cough, abd pain, n/v, diarrhea. COVID vax x3, booster about 1 wk ago.    Pt went for cath 12/31 with findings of 100% occlusion of RCA s/p DESx3, intracoronary integrelin 19mg given. pLAD 80% occluded. Also given 1 of versed, 25 of fentanyl and 6000U heparin bolus. LVEDP 8-12, ventriculogram EF 40%, given 500cc bolus for hypotension and improved. Post procedure, CP improved to 1/10. Patient is planned for proximal LAD staging today.     The patients trops have trended from 124--> 918 --> 616 and lactate 2.9 --> 2.0. LVEDP 8-12, ventriculogram showed EF 40%.    The patient was found to have a TSH of 0.09 and pending free T4, his a1c was 11.9 (taking metformin 1000qd at home) and started on lantus 16 and premeal 5 w/ sliding scale as he is insulin naive, this can be titrated up as needed during his stay.     The patient is currently hemodynamically stable to be downgraded to the floors      FOR FOLLOW-UP:  [ ] TTE s/p cath  [ ] Endo consult       Keturah Carter MD  PGY-3, Internal Medicine  MAR Spectra: [phone]

## 2022-01-01 NOTE — PROGRESS NOTE ADULT - SUBJECTIVE AND OBJECTIVE BOX
INTERNAL MEDICINE PROGRESS NOTE    Dr. Portillo Mcgee, DO  Hospitalist  Division of Hospital Medicine  [facility]  Available via [employer] Teams    HPI / CCU course  In brief, this is a 67 y/o M PMHx of HTN, DM, prostate ca, reported PCI in past presented to [facility] with stemi and transferred here for intervention. Angiogram on 12/31 with findings of 100% occlusion of RCA s/p DESx3, intracoronary integrelin 19mg given. pLAD 80% occluded. Plan for staged PCI of pLAD lesion. Of note patient with uncontrolled diabetes with endo on board. Patient hemodynamically stable and deemed stable for downgrade from CCU to the floors.    SUBJECTIVE:  No acute complaints.     REVIEW OF SYSTEMS   12 point review of systems negative except for above.     PAST MEDICAL & SURGICAL HISTORY:  HTN (hypertension)    Diabetes    Hypercholesterolemia    Sleep apnea  uses CPAP machine at night    Prostate cancer    History of pterygium excision  left eye        MEDICATIONS  (STANDING):  aspirin enteric coated 81 milliGRAM(s) Oral daily  atorvastatin 80 milliGRAM(s) Oral at bedtime  chlorhexidine 2% Cloths 1 Application(s) Topical daily  dextrose 40% Gel 15 Gram(s) Oral once  dextrose 50% Injectable 25 Gram(s) IV Push once  dextrose 50% Injectable 12.5 Gram(s) IV Push once  dextrose 50% Injectable 25 Gram(s) IV Push once  heparin   Injectable 5000 Unit(s) SubCutaneous every 8 hours  insulin glargine Injectable (LANTUS) 16 Unit(s) SubCutaneous at bedtime  insulin lispro (ADMELOG) corrective regimen sliding scale   SubCutaneous three times a day before meals  insulin lispro (ADMELOG) corrective regimen sliding scale   SubCutaneous at bedtime  insulin lispro Injectable (ADMELOG) 6 Unit(s) SubCutaneous three times a day before meals  metoprolol tartrate 12.5 milliGRAM(s) Oral two times a day  oxybutynin 5 milliGRAM(s) Oral two times a day  ticagrelor 90 milliGRAM(s) Oral every 12 hours    MEDICATIONS  (PRN):      Allergies    No Known Allergies    Intolerances        T(C): 37.1 (01-01-22 @ 14:45), Max: 37.1 (01-01-22 @ 14:45)  T(F): 98.7 (01-01-22 @ 14:45), Max: 98.7 (01-01-22 @ 14:45)  HR: 75 (01-01-22 @ 14:45) (58 - 93)  BP: 150/68 (01-01-22 @ 14:45) (97/69 - 150/68)  ABP: --  ABP(mean): --  RR: 18 (01-01-22 @ 14:45) (8 - 25)  SpO2: 98% (01-01-22 @ 14:45) (93% - 98%)      CONSTITUTIONAL: No acute distress.   HEENT:  Conjunctiva clear B/L.  Moist oral mucosa.   Cardiovascular: RRR with no murmurs. No JVD noted. No lower extremity edema B/L. Extremities are warm and well perfused. Radial pulses 2+ B/L. Dorsalis pedis pulses 2+ B/L.    Respiratory: Lungs CTAB. No wrr. No accessory muscle use.   Gastrointestinal:  Soft, nontender. Non-distended. Non-rigid. No CVA tenderness B/L.  MSK:  No joint swelling. No joint erythema B/L. No midline spinal tenderness.  Neurologic:  Alert and awake. Moving all extremities. Following commands. Making eye contact.    Skin:  No rashes noted. No skin erythema noted. No abdominal wall, flank, back, or thigh ecchymoses. Wound sites c/d/i.  Psych:  Normal affect. Normal Mood.     LABS                        13.3   6.59  )-----------( 274      ( 01 Jan 2022 00:25 )             40.7     01-01    132<L>  |  99  |  13  ----------------------------<  271<H>  4.0   |  18<L>  |  0.72    Ca    9.2      01 Jan 2022 00:25  Phos  3.6     01-01  Mg     2.1     01-01    TPro  6.4  /  Alb  3.7  /  TBili  0.3  /  DBili  x   /  AST  36  /  ALT  10  /  AlkPhos  84  01-01    PT/INR - ( 31 Dec 2021 17:57 )   PT: 12.2 sec;   INR: 1.02 ratio         PTT - ( 31 Dec 2021 17:57 )  PTT:49.0 sec      ALL RECENT STUDIES REVIEWED INCLUDING REPORTS AVAILABLE     CARE DISCUSSED WITH ALL CONSULTANTS    INTERNAL MEDICINE PROGRESS NOTE    Dr. Portillo Mcgee, DO  Hospitalist  Division of Hospital Medicine  [facility]  Available via [employer] Teams    HPI / CCU course  In brief, this is a 65 y/o M PMHx of HTN, DM, prostate ca, reported PCI in past presented to [facility] with stemi and transferred here for intervention. Angiogram on 12/31 with findings of 100% occlusion of RCA s/p DESx3, intracoronary integrelin 19mg given. pLAD 80% occluded. Plan for staged PCI of pLAD lesion. Of note patient with uncontrolled diabetes with endo on board. Patient hemodynamically stable and deemed stable for downgrade from CCU to the floors.    SUBJECTIVE:  No acute complaints.     REVIEW OF SYSTEMS   12 point review of systems negative except for above.     PAST MEDICAL & SURGICAL HISTORY:  HTN (hypertension)    Diabetes    Hypercholesterolemia    Sleep apnea  uses CPAP machine at night    Prostate cancer    History of pterygium excision  left eye        MEDICATIONS  (STANDING):  aspirin enteric coated 81 milliGRAM(s) Oral daily  atorvastatin 80 milliGRAM(s) Oral at bedtime  chlorhexidine 2% Cloths 1 Application(s) Topical daily  dextrose 40% Gel 15 Gram(s) Oral once  dextrose 50% Injectable 25 Gram(s) IV Push once  dextrose 50% Injectable 12.5 Gram(s) IV Push once  dextrose 50% Injectable 25 Gram(s) IV Push once  heparin   Injectable 5000 Unit(s) SubCutaneous every 8 hours  insulin glargine Injectable (LANTUS) 16 Unit(s) SubCutaneous at bedtime  insulin lispro (ADMELOG) corrective regimen sliding scale   SubCutaneous three times a day before meals  insulin lispro (ADMELOG) corrective regimen sliding scale   SubCutaneous at bedtime  insulin lispro Injectable (ADMELOG) 6 Unit(s) SubCutaneous three times a day before meals  metoprolol tartrate 12.5 milliGRAM(s) Oral two times a day  oxybutynin 5 milliGRAM(s) Oral two times a day  ticagrelor 90 milliGRAM(s) Oral every 12 hours    MEDICATIONS  (PRN):      Allergies    No Known Allergies    Intolerances        T(C): 37.1 (01-01-22 @ 14:45), Max: 37.1 (01-01-22 @ 14:45)  T(F): 98.7 (01-01-22 @ 14:45), Max: 98.7 (01-01-22 @ 14:45)  HR: 75 (01-01-22 @ 14:45) (58 - 93)  BP: 150/68 (01-01-22 @ 14:45) (97/69 - 150/68)  ABP: --  ABP(mean): --  RR: 18 (01-01-22 @ 14:45) (8 - 25)  SpO2: 98% (01-01-22 @ 14:45) (93% - 98%)      CONSTITUTIONAL: No acute distress.   HEENT:  Conjunctiva clear B/L.  Moist oral mucosa.   Cardiovascular: RRR with no murmurs. No JVD noted. No lower extremity edema B/L. Extremities are warm and well perfused.  Dorsalis pedis pulses 2+ B/L. Fingers warm and well perfused.   Respiratory: Lungs CTAB. No wrr. No accessory muscle use.   Gastrointestinal:  Soft, nontender. Non-distended. Non-rigid. No CVA tenderness B/L.  MSK:  No joint swelling. No joint erythema B/L. No midline spinal tenderness.  Neurologic:  Alert and awake. Moving all extremities. Following commands. Making eye contact.  No numbness or tingling in digits.   Skin:  No rashes noted. No skin erythema noted. No abdominal wall, flank, back, or thigh ecchymoses. Right radial band noted.   Psych:  Normal affect. Normal Mood.     LABS                        13.3   6.59  )-----------( 274      ( 01 Jan 2022 00:25 )             40.7     01-01    132<L>  |  99  |  13  ----------------------------<  271<H>  4.0   |  18<L>  |  0.72    Ca    9.2      01 Jan 2022 00:25  Phos  3.6     01-01  Mg     2.1     01-01    TPro  6.4  /  Alb  3.7  /  TBili  0.3  /  DBili  x   /  AST  36  /  ALT  10  /  AlkPhos  84  01-01    PT/INR - ( 31 Dec 2021 17:57 )   PT: 12.2 sec;   INR: 1.02 ratio         PTT - ( 31 Dec 2021 17:57 )  PTT:49.0 sec      ALL RECENT STUDIES REVIEWED INCLUDING REPORTS AVAILABLE     CARE DISCUSSED WITH ALL CONSULTANTS

## 2022-01-01 NOTE — PROGRESS NOTE ADULT - ATTENDING COMMENTS
Mr Del Valle is resting comfortably this morning, no angina, cardiac biomarkers trending down. No evidence of CHF or arrhythmia over night or this morning. He is s/p multiple stents of the RCA (proximal, mid, and distal, into the RPDA) in the setting of an IW STEMI. Incidentally noted was a significant proximal LAD stenosis, which is planned for PCI this morning; informed consent signed and all questions were answered in detail. Continue aspirin, Brilinta, statin, and a low dose beta blocker; no ACE or ARB just yet with some SBP's in the 90's (asymptomatic). TTE tomorrow. Outpatient cardiac follow up was with Dr Ronald Witt, and he will see him shortly after discharge as well. Cased discussed with Dr Huerta this morning as well.  Jesus Alberto Hager MD

## 2022-01-01 NOTE — PROGRESS NOTE ADULT - PROBLEM SELECTOR PLAN 2
Endo consult appreciated  lantus 16 qhs  ademelog 6 units before meals.   lssi  dm diet Endo consult appreciated  lantus 16 qhs  ademelog 6 units before meals.   lssi  dm diet  also with history of emily; need to clarify cpap settings; c/w cpap 5 qhs for now. Endo consult appreciated  lantus 16 qhs  ademelog 6 units before meals.   lssi  dm diet  also with history of emily; can use home cpap. Endo consult appreciated  lantus 18 qhs  ademelog 6 units before meals.   lssi  dm diet  also with history of emily; can use home cpap.

## 2022-01-01 NOTE — PROGRESS NOTE ADULT - ASSESSMENT
66M history of HTN, DM, prostate ca, SHERRY (qhs CPAP) reported PCI in past (?) who presented to  with CP since yesterday, found to have IWSTEMI, load/brillinta txfer to [facility] for cath (12/31),  s/p 100% occlusion of RCA s/p DESx3, LAD 80%, plan for staging to LAD on Monday.    #Neuro:  -no active issue    #Cardiovascular:  -IWSTEMI  -s/p ASA brillinta atorva load at   -c/w ASA 81 qd, brillinta 90 BID, atorva 80 qd  TropI 124   [ ] trend CE  [ ] f/u a1c, lipid, tsh    -HFreEF  LVEDP 8-12, ventriculogram EF 40%  s/p 500 cc bolus in cath lab for hypotension    #Pulmonary:  -on RA   -hx of SHERRY on CPAP, c/w home CPAP    #FEN/GI:  -DASH/TLC diet    #Renal:  -no active issue    #:  -no active issue  -uses clomipramine PRN at home for intercourse    #ID:  -no active issue  COVID-19 PCR negative.    #Heme:  -DVT ppx: Hep subc    #Endo:  Hx of DM on metformin 1000mg qd  -ISS  -diabetic diet  -check a1c    #Ethics:  - 66M history of HTN, DM, prostate ca, SHERRY (qhs CPAP) reported PCI in past (?) who presented to  with CP since yesterday, found to have IWSTEMI, load/brillinta txfer to [facility] for cath (12/31),  s/p 100% occlusion of RCA s/p DESx3, LAD 80%, plan for staging to LAD on Monday.    #Neuro:  -no active issue    #Cardiovascular:  -IWSTEMI  -s/p ASA brillinta atorva load at   -c/w ASA 81 qd, brillinta 90 BID, atorva 80 qd  - s/p PCI KINDRA X3 to %, pLAD 70% plan for staging to LAD monday    - TropI 124   - Trops 918 --> 616  - Lactate 2.9 --> 2.0   - lipid panel chol 191, , nonHDL 150    -HFreEF  LVEDP 8-12, ventriculogram EF 40%  s/p 500 cc bolus in cath lab for hypotension    #Pulmonary:  -on RA   -hx of SHERRY on CPAP, c/w home CPAP  - encourage incentive spirometry    #FEN/GI:  -DASH/TLC diet    #Renal:  -no active issue    #:  -no active issue  -uses clomipramine PRN at home for intercourse    #ID:  -no active issue  COVID-19 PCR negative.    #Heme:  -DVT ppx: Hep subc    #Endo:  - TSH 0.09  - f/up free t4  - a1c 11.9, on metformin 1000 qd at home  - starting 16 lantus and 5 premeal as pt is insulin naive   -ISS  -diabetic diet     66M history of HTN, DM, prostate ca, SHERRY (qhs CPAP) reported PCI in past (?) who presented to  with CP since yesterday, found to have IWSTEMI, load/brillinta txfer to [facility] for cath (12/31),  s/p 100% occlusion of RCA s/p DESx3, LAD 80%, plan for staging to LAD on Monday.    #Neuro:  -no active issue    #Cardiovascular:  -IWSTEMI  -s/p ASA brillinta atorva load at   -c/w ASA 81 qd, brillinta 90 BID, atorva 80 qd  - s/p PCI KINDRA X3 to %, pLAD 70% plan for staging to LAD today  - TropI 124   - Trops 918 --> 616  - Lactate 2.9 --> 2.0   - lipid panel chol 191, , nonHDL 150  - f/up TTE tomorrow    -HFreEF  LVEDP 8-12, ventriculogram EF 40%  s/p 500 cc bolus in cath lab for hypotension    #Pulmonary:  -on RA   -hx of SHERRY on CPAP, c/w home CPAP  - encourage incentive spirometry    #FEN/GI:  -DASH/TLC diet    #Renal:  -no active issue    #:  -no active issue  -uses clomipramine PRN at home for intercourse    #ID:  -no active issue  COVID-19 PCR negative.    #Heme:  -DVT ppx: Hep subc    #Endo:  - TSH 0.09  - f/up free t4  - a1c 11.9, on metformin 1000 qd at home  - starting 16 lantus and 5 premeal as pt is insulin naive   -ISS  -diabetic diet

## 2022-01-01 NOTE — PROGRESS NOTE ADULT - SUBJECTIVE AND OBJECTIVE BOX
Patient is a 66y old  Male who presents with a chief complaint of STEMI (31 Dec 2021 20:10)      SUBJECTIVE / OVERNIGHT EVENTS: Patient seen and examined at bedside.    MEDICATIONS  (STANDING):  aspirin enteric coated 81 milliGRAM(s) Oral daily  atorvastatin 80 milliGRAM(s) Oral at bedtime  chlorhexidine 2% Cloths 1 Application(s) Topical daily  dextrose 40% Gel 15 Gram(s) Oral once  dextrose 5%. 1000 milliLiter(s) (50 mL/Hr) IV Continuous <Continuous>  dextrose 5%. 1000 milliLiter(s) (100 mL/Hr) IV Continuous <Continuous>  dextrose 50% Injectable 25 Gram(s) IV Push once  dextrose 50% Injectable 12.5 Gram(s) IV Push once  dextrose 50% Injectable 25 Gram(s) IV Push once  glucagon  Injectable 1 milliGRAM(s) IntraMuscular once  heparin   Injectable 5000 Unit(s) SubCutaneous every 8 hours  insulin lispro (ADMELOG) corrective regimen sliding scale   SubCutaneous three times a day before meals  insulin lispro (ADMELOG) corrective regimen sliding scale   SubCutaneous at bedtime  metoprolol tartrate 12.5 milliGRAM(s) Oral two times a day  oxybutynin 5 milliGRAM(s) Oral two times a day  ticagrelor 90 milliGRAM(s) Oral every 12 hours    MEDICATIONS  (PRN):      Vital Signs Last 24 Hrs  T(C): 36.7 (01 Jan 2022 07:00), Max: 37.1 (31 Dec 2021 14:30)  T(F): 98.1 (01 Jan 2022 07:00), Max: 98.7 (31 Dec 2021 14:30)  HR: 65 (01 Jan 2022 08:00) (58 - 102)  BP: 97/71 (01 Jan 2022 08:00) (97/69 - 138/77)  BP(mean): 80 (01 Jan 2022 08:00) (79 - 102)  RR: 18 (01 Jan 2022 08:00) (8 - 25)  SpO2: 98% (01 Jan 2022 08:00) (93% - 100%)  CAPILLARY BLOOD GLUCOSE      POCT Blood Glucose.: 210 mg/dL (01 Jan 2022 07:31)  POCT Blood Glucose.: 305 mg/dL (31 Dec 2021 22:45)  POCT Blood Glucose.: 243 mg/dL (31 Dec 2021 16:27)    I&O's Summary    31 Dec 2021 07:01  -  01 Jan 2022 07:00  --------------------------------------------------------  IN: 810 mL / OUT: 1180 mL / NET: -370 mL        PHYSICAL EXAM:      LABS:                        13.3   6.59  )-----------( 274      ( 01 Jan 2022 00:25 )             40.7     01-01    132<L>  |  99  |  13  ----------------------------<  271<H>  4.0   |  18<L>  |  0.72    Ca    9.2      01 Jan 2022 00:25  Phos  3.6     01-01  Mg     2.1     01-01    TPro  6.4  /  Alb  3.7  /  TBili  0.3  /  DBili  x   /  AST  36  /  ALT  10  /  AlkPhos  84  01-01    PT/INR - ( 31 Dec 2021 17:57 )   PT: 12.2 sec;   INR: 1.02 ratio         PTT - ( 31 Dec 2021 17:57 )  PTT:49.0 sec  CARDIAC MARKERS ( 01 Jan 2022 06:08 )  x     / x     / 399 U/L / x     / 26.0 ng/mL  CARDIAC MARKERS ( 01 Jan 2022 00:25 )  x     / x     / 437 U/L / x     / 35.3 ng/mL  CARDIAC MARKERS ( 31 Dec 2021 18:01 )  x     / x     / 412 U/L / x     / 38.1 ng/mL          RADIOLOGY & ADDITIONAL TESTS:    Imaging Personally Reviewed:    Consultant(s) Notes Reviewed:      Care Discussed with Consultants/Other Providers:    Alicia Price, PGY-1; [facility] Pager: [phone]; [facility] Pager: [phone]   Patient is a 66y old  Male who presents with a chief complaint of STEMI (31 Dec 2021 20:10)      SUBJECTIVE / OVERNIGHT EVENTS: Patient seen and examined at bedside.    MEDICATIONS  (STANDING):  aspirin enteric coated 81 milliGRAM(s) Oral daily  atorvastatin 80 milliGRAM(s) Oral at bedtime  chlorhexidine 2% Cloths 1 Application(s) Topical daily  dextrose 40% Gel 15 Gram(s) Oral once  dextrose 5%. 1000 milliLiter(s) (50 mL/Hr) IV Continuous <Continuous>  dextrose 5%. 1000 milliLiter(s) (100 mL/Hr) IV Continuous <Continuous>  dextrose 50% Injectable 25 Gram(s) IV Push once  dextrose 50% Injectable 12.5 Gram(s) IV Push once  dextrose 50% Injectable 25 Gram(s) IV Push once  glucagon  Injectable 1 milliGRAM(s) IntraMuscular once  heparin   Injectable 5000 Unit(s) SubCutaneous every 8 hours  insulin lispro (ADMELOG) corrective regimen sliding scale   SubCutaneous three times a day before meals  insulin lispro (ADMELOG) corrective regimen sliding scale   SubCutaneous at bedtime  metoprolol tartrate 12.5 milliGRAM(s) Oral two times a day  oxybutynin 5 milliGRAM(s) Oral two times a day  ticagrelor 90 milliGRAM(s) Oral every 12 hours    MEDICATIONS  (PRN):      Vital Signs Last 24 Hrs  T(C): 36.7 (01 Jan 2022 07:00), Max: 37.1 (31 Dec 2021 14:30)  T(F): 98.1 (01 Jan 2022 07:00), Max: 98.7 (31 Dec 2021 14:30)  HR: 65 (01 Jan 2022 08:00) (58 - 102)  BP: 97/71 (01 Jan 2022 08:00) (97/69 - 138/77)  BP(mean): 80 (01 Jan 2022 08:00) (79 - 102)  RR: 18 (01 Jan 2022 08:00) (8 - 25)  SpO2: 98% (01 Jan 2022 08:00) (93% - 100%)  CAPILLARY BLOOD GLUCOSE      POCT Blood Glucose.: 210 mg/dL (01 Jan 2022 07:31)  POCT Blood Glucose.: 305 mg/dL (31 Dec 2021 22:45)  POCT Blood Glucose.: 243 mg/dL (31 Dec 2021 16:27)    I&O's Summary    31 Dec 2021 07:01  -  01 Jan 2022 07:00  --------------------------------------------------------  IN: 810 mL / OUT: 1180 mL / NET: -370 mL        PHYSICAL EXAM:  GENERAL APPEARANCE: Well developed, NAD  HEENT:  PERRL, EOMI. hearing grossly intact.  NECK: Neck supple, non-tender no lymphadenopathy, masses or thyromegaly.  CARDIAC: Normal S1 and S2. no mrg. RRR  LUNGS: Clear to auscultation B/L, no rales, rhonchi, or wheezing  ABDOMEN: Soft , NTND, bowel sounds normal. No guarding or rebound.   MUSCULOSKELETAL: ROM intact.  No joint erythema or tenderness.   EXTREMITIES: No edema. Peripheral pulses intact.   NEUROLOGICAL: Non focal. Strength and sensation symmetric and intact throughout.   SKIN: Warm and dry , Well perfused  PSYCHIATRIC: AOx3 , Normal mood and affect      LABS:                        13.3   6.59  )-----------( 274      ( 01 Jan 2022 00:25 )             40.7     01-01    132<L>  |  99  |  13  ----------------------------<  271<H>  4.0   |  18<L>  |  0.72    Ca    9.2      01 Jan 2022 00:25  Phos  3.6     01-01  Mg     2.1     01-01    TPro  6.4  /  Alb  3.7  /  TBili  0.3  /  DBili  x   /  AST  36  /  ALT  10  /  AlkPhos  84  01-01    PT/INR - ( 31 Dec 2021 17:57 )   PT: 12.2 sec;   INR: 1.02 ratio         PTT - ( 31 Dec 2021 17:57 )  PTT:49.0 sec  CARDIAC MARKERS ( 01 Jan 2022 06:08 )  x     / x     / 399 U/L / x     / 26.0 ng/mL  CARDIAC MARKERS ( 01 Jan 2022 00:25 )  x     / x     / 437 U/L / x     / 35.3 ng/mL  CARDIAC MARKERS ( 31 Dec 2021 18:01 )  x     / x     / 412 U/L / x     / 38.1 ng/mL          RADIOLOGY & ADDITIONAL TESTS:    Imaging Personally Reviewed:    Consultant(s) Notes Reviewed:      Care Discussed with Consultants/Other Providers:    Alicia Price, PGY-1; [facility] Pager: [phone]; [facility] Pager: [phone]

## 2022-01-01 NOTE — CHART NOTE - NSCHARTNOTEFT_GEN_A_CORE
Removal of Radial Band    Pulses in the right upper extremity are palpable.  The patient was placed in the supine position. The insertion site was identified and the band deflated per protocol. The radial band was removed slowly.  Hemostasis achieved therefore direct pressure was not applied.  Monitoring of the right wrists and both upper extremities including neuro-vascular checks and vital signs every 15 minutes x 4.    Complications: None      Krystle Joiner [facility] [phone]

## 2022-01-01 NOTE — CONSULT NOTE ADULT - ASSESSMENT
66M history of HTN, uncontrolled T2DM c/b CAD, prostate Ca presenting for inferior wall STEMI, sp cath with plan for staging PCI to LAD on 1/3.     Endocrine consulted for uncontrolled T2DM, A1c 11.9%    #Hyperglycemia in setting of uncontrolled T2DM c/b CAD  A1c 11.9%, Goal < 7%  Inpatient FS goal 100-180. Above goal  Recs:   -Lantus   -Admelog   -low dose correctional scale qAC and qHS   For Discharge:  -D/c with basal + orals vs. oral agents alone depending on insulin requirements   -Can f/u with     #HTN  BP management per primary team     #HLD  Check lipid panel  Continue high intensity statin    Joan Brunner MD  Endocrine Fellow  Pager: [phone]/JAYCOB [phone]  Consults 9am-5pm: [phone]  After 5pm and weekends: [phone]   66M history of HTN, uncontrolled T2DM c/b CAD, prostate Ca presenting for inferior wall STEMI, sp cath with plan for staging PCI to LAD on 1/3.     Endocrine consulted for uncontrolled T2DM, A1c 11.9%    #Hyperglycemia in setting of uncontrolled T2DM c/b CAD  A1c 11.9%, Goal < 7%  Inpatient FS goal 100-180. Above goal  Recs:   -Lantus 18 units daily  -Admelog 6 units qAC  -low dose correctional scale qAC and qHS   For Discharge:  -D/c with basal + oral agents. Please send Trulicity 0.75 mg subq/weekly and Jardiance 25 mg daily to [facility] to see if it is covered by insurance  -Can f/u with Endocrine Practice at [address] [place] [address], [address], [place] [address]; Ph # [phone]    #HTN  BP management per primary team     #HLD  Check lipid panel  Continue high intensity statin    Joan Brunner MD  Endocrine Fellow  Pager: [phone]/JAYCOB [phone]  Consults 9am-5pm: [phone]  After 5pm and weekends: [phone]   66M history of HTN, uncontrolled T2DM c/b CAD, prostate Ca presenting for inferior wall STEMI, sp cath with plan for staging PCI to LAD on 1/3.     Endocrine consulted for uncontrolled T2DM, A1c 11.9%    #Hyperglycemia in setting of uncontrolled T2DM c/b CAD  A1c 11.9%, Goal < 7%  Inpatient FS goal 100-180. Above goal  Recs:   -Give Lantus 18 units now and then dose Lantus 18 units daily for 6 pm (orders adjusted)  -Continue Admelog 6 units qAC  -low dose correctional scale qAC and qHS   For Discharge:  -D/c with basal + oral agents. Please send Trulicity 0.75 mg subq/weekly and Jardiance 25 mg daily to [facility] to see if it is covered by insurance  -Can f/u with Endocrine Practice at [address] [place] [address], [address], [place] [address]; Ph # [phone]    #HTN  BP management per primary team     #HLD  Check lipid panel  Continue high intensity statin    Joan Brunner MD  Endocrine Fellow  Pager: [phone]/JAYCOB [phone]  Consults 9am-5pm: [phone]  After 5pm and weekends: [phone]

## 2022-01-02 LAB
ALBUMIN SERPL ELPH-MCNC: 4 G/DL — SIGNIFICANT CHANGE UP (ref 3.3–5)
ALP SERPL-CCNC: 80 U/L — SIGNIFICANT CHANGE UP (ref 40–120)
ALT FLD-CCNC: 11 U/L — SIGNIFICANT CHANGE UP (ref 10–45)
ANION GAP SERPL CALC-SCNC: 14 MMOL/L — SIGNIFICANT CHANGE UP (ref 5–17)
AST SERPL-CCNC: 15 U/L — SIGNIFICANT CHANGE UP (ref 10–40)
BASOPHILS # BLD AUTO: 0.05 K/UL — SIGNIFICANT CHANGE UP (ref 0–0.2)
BASOPHILS NFR BLD AUTO: 0.8 % — SIGNIFICANT CHANGE UP (ref 0–2)
BILIRUB SERPL-MCNC: 0.5 MG/DL — SIGNIFICANT CHANGE UP (ref 0.2–1.2)
BUN SERPL-MCNC: 13 MG/DL — SIGNIFICANT CHANGE UP (ref 7–23)
CALCIUM SERPL-MCNC: 9.1 MG/DL — SIGNIFICANT CHANGE UP (ref 8.4–10.5)
CHLORIDE SERPL-SCNC: 102 MMOL/L — SIGNIFICANT CHANGE UP (ref 96–108)
CO2 SERPL-SCNC: 21 MMOL/L — LOW (ref 22–31)
CREAT SERPL-MCNC: 0.68 MG/DL — SIGNIFICANT CHANGE UP (ref 0.5–1.3)
EOSINOPHIL # BLD AUTO: 0.09 K/UL — SIGNIFICANT CHANGE UP (ref 0–0.5)
EOSINOPHIL NFR BLD AUTO: 1.4 % — SIGNIFICANT CHANGE UP (ref 0–6)
GLUCOSE BLDC GLUCOMTR-MCNC: 139 MG/DL — HIGH (ref 70–99)
GLUCOSE BLDC GLUCOMTR-MCNC: 166 MG/DL — HIGH (ref 70–99)
GLUCOSE BLDC GLUCOMTR-MCNC: 212 MG/DL — HIGH (ref 70–99)
GLUCOSE BLDC GLUCOMTR-MCNC: 321 MG/DL — HIGH (ref 70–99)
GLUCOSE SERPL-MCNC: 222 MG/DL — HIGH (ref 70–99)
HCT VFR BLD CALC: 41.1 % — SIGNIFICANT CHANGE UP (ref 39–50)
HGB BLD-MCNC: 13.8 G/DL — SIGNIFICANT CHANGE UP (ref 13–17)
IMM GRANULOCYTES NFR BLD AUTO: 0.3 % — SIGNIFICANT CHANGE UP (ref 0–1.5)
LYMPHOCYTES # BLD AUTO: 2.5 K/UL — SIGNIFICANT CHANGE UP (ref 1–3.3)
LYMPHOCYTES # BLD AUTO: 37.8 % — SIGNIFICANT CHANGE UP (ref 13–44)
MAGNESIUM SERPL-MCNC: 1.9 MG/DL — SIGNIFICANT CHANGE UP (ref 1.6–2.6)
MCHC RBC-ENTMCNC: 31.2 PG — SIGNIFICANT CHANGE UP (ref 27–34)
MCHC RBC-ENTMCNC: 33.6 GM/DL — SIGNIFICANT CHANGE UP (ref 32–36)
MCV RBC AUTO: 93 FL — SIGNIFICANT CHANGE UP (ref 80–100)
MONOCYTES # BLD AUTO: 0.68 K/UL — SIGNIFICANT CHANGE UP (ref 0–0.9)
MONOCYTES NFR BLD AUTO: 10.3 % — SIGNIFICANT CHANGE UP (ref 2–14)
NEUTROPHILS # BLD AUTO: 3.28 K/UL — SIGNIFICANT CHANGE UP (ref 1.8–7.4)
NEUTROPHILS NFR BLD AUTO: 49.4 % — SIGNIFICANT CHANGE UP (ref 43–77)
NRBC # BLD: 0 /100 WBCS — SIGNIFICANT CHANGE UP (ref 0–0)
PHOSPHATE SERPL-MCNC: 4.1 MG/DL — SIGNIFICANT CHANGE UP (ref 2.5–4.5)
PLATELET # BLD AUTO: 278 K/UL — SIGNIFICANT CHANGE UP (ref 150–400)
POTASSIUM SERPL-MCNC: 4 MMOL/L — SIGNIFICANT CHANGE UP (ref 3.5–5.3)
POTASSIUM SERPL-SCNC: 4 MMOL/L — SIGNIFICANT CHANGE UP (ref 3.5–5.3)
PROT SERPL-MCNC: 6.9 G/DL — SIGNIFICANT CHANGE UP (ref 6–8.3)
RBC # BLD: 4.42 M/UL — SIGNIFICANT CHANGE UP (ref 4.2–5.8)
RBC # FLD: 13.3 % — SIGNIFICANT CHANGE UP (ref 10.3–14.5)
SARS-COV-2 RNA SPEC QL NAA+PROBE: SIGNIFICANT CHANGE UP
SODIUM SERPL-SCNC: 137 MMOL/L — SIGNIFICANT CHANGE UP (ref 135–145)
T3FREE SERPL-MCNC: 2.52 PG/ML — SIGNIFICANT CHANGE UP (ref 1.8–4.6)
WBC # BLD: 6.62 K/UL — SIGNIFICANT CHANGE UP (ref 3.8–10.5)
WBC # FLD AUTO: 6.62 K/UL — SIGNIFICANT CHANGE UP (ref 3.8–10.5)

## 2022-01-02 PROCEDURE — 99232 SBSQ HOSP IP/OBS MODERATE 35: CPT

## 2022-01-02 PROCEDURE — 99232 SBSQ HOSP IP/OBS MODERATE 35: CPT | Mod: GC

## 2022-01-02 RX ORDER — METOPROLOL TARTRATE 50 MG
25 TABLET ORAL DAILY
Refills: 0 | Status: DISCONTINUED | OUTPATIENT
Start: 2022-01-02 | End: 2022-01-03

## 2022-01-02 RX ORDER — LISINOPRIL 2.5 MG/1
5 TABLET ORAL DAILY
Refills: 0 | Status: DISCONTINUED | OUTPATIENT
Start: 2022-01-02 | End: 2022-01-03

## 2022-01-02 RX ORDER — INSULIN LISPRO 100/ML
8 VIAL (ML) SUBCUTANEOUS
Refills: 0 | Status: DISCONTINUED | OUTPATIENT
Start: 2022-01-02 | End: 2022-01-03

## 2022-01-02 RX ORDER — INSULIN LISPRO 100/ML
VIAL (ML) SUBCUTANEOUS
Refills: 0 | Status: DISCONTINUED | OUTPATIENT
Start: 2022-01-02 | End: 2022-01-03

## 2022-01-02 RX ORDER — INSULIN GLARGINE 100 [IU]/ML
25 INJECTION, SOLUTION SUBCUTANEOUS
Refills: 0 | Status: DISCONTINUED | OUTPATIENT
Start: 2022-01-02 | End: 2022-01-03

## 2022-01-02 RX ADMIN — CHLORHEXIDINE GLUCONATE 1 APPLICATION(S): 213 SOLUTION TOPICAL at 21:47

## 2022-01-02 RX ADMIN — ATORVASTATIN CALCIUM 80 MILLIGRAM(S): 80 TABLET, FILM COATED ORAL at 21:11

## 2022-01-02 RX ADMIN — Medication 4: at 11:41

## 2022-01-02 RX ADMIN — Medication 1 DROP(S): at 05:09

## 2022-01-02 RX ADMIN — KETOTIFEN FUMARATE 1 DROP(S): 0.34 SOLUTION OPHTHALMIC at 17:02

## 2022-01-02 RX ADMIN — INSULIN GLARGINE 25 UNIT(S): 100 INJECTION, SOLUTION SUBCUTANEOUS at 17:01

## 2022-01-02 RX ADMIN — KETOTIFEN FUMARATE 1 DROP(S): 0.34 SOLUTION OPHTHALMIC at 05:09

## 2022-01-02 RX ADMIN — Medication 12.5 MILLIGRAM(S): at 05:09

## 2022-01-02 RX ADMIN — LISINOPRIL 5 MILLIGRAM(S): 2.5 TABLET ORAL at 15:23

## 2022-01-02 RX ADMIN — HEPARIN SODIUM 5000 UNIT(S): 5000 INJECTION INTRAVENOUS; SUBCUTANEOUS at 21:11

## 2022-01-02 RX ADMIN — TICAGRELOR 90 MILLIGRAM(S): 90 TABLET ORAL at 11:41

## 2022-01-02 RX ADMIN — Medication 81 MILLIGRAM(S): at 11:41

## 2022-01-02 RX ADMIN — Medication 1 DROP(S): at 17:01

## 2022-01-02 RX ADMIN — Medication 2: at 07:48

## 2022-01-02 RX ADMIN — HEPARIN SODIUM 5000 UNIT(S): 5000 INJECTION INTRAVENOUS; SUBCUTANEOUS at 13:45

## 2022-01-02 RX ADMIN — Medication 5 MILLIGRAM(S): at 17:02

## 2022-01-02 RX ADMIN — Medication 6 UNIT(S): at 07:48

## 2022-01-02 RX ADMIN — HEPARIN SODIUM 5000 UNIT(S): 5000 INJECTION INTRAVENOUS; SUBCUTANEOUS at 05:08

## 2022-01-02 RX ADMIN — TICAGRELOR 90 MILLIGRAM(S): 90 TABLET ORAL at 21:11

## 2022-01-02 RX ADMIN — Medication 8 UNIT(S): at 17:01

## 2022-01-02 RX ADMIN — Medication 8 UNIT(S): at 11:46

## 2022-01-02 RX ADMIN — Medication 25 MILLIGRAM(S): at 15:23

## 2022-01-02 RX ADMIN — Medication 5 MILLIGRAM(S): at 05:24

## 2022-01-02 NOTE — PROGRESS NOTE ADULT - ATTENDING COMMENTS
Mr. Del Valle reports no significant symptoms at the time of my evaluation.  He presented with IW MI s/p PCI/stent to RCA and staged PCI/stent of LAD.  His LVF is preserved.  I agree with the plan as documented above.

## 2022-01-02 NOTE — PROGRESS NOTE ADULT - SUBJECTIVE AND OBJECTIVE BOX
Patient is a 66y old  Male who presents with a chief complaint of STEMI (02 Jan 2022 12:50)        SUBJECTIVE / OVERNIGHT EVENTS: patient denies any cp or sob. feels better.       MEDICATIONS  (STANDING):  artificial  tears Solution 1 Drop(s) Both EYES two times a day  aspirin enteric coated 81 milliGRAM(s) Oral daily  atorvastatin 80 milliGRAM(s) Oral at bedtime  chlorhexidine 2% Cloths 1 Application(s) Topical daily  dextrose 40% Gel 15 Gram(s) Oral once  dextrose 50% Injectable 25 Gram(s) IV Push once  dextrose 50% Injectable 12.5 Gram(s) IV Push once  dextrose 50% Injectable 25 Gram(s) IV Push once  glucagon  Injectable 1 milliGRAM(s) IntraMuscular once  heparin   Injectable 5000 Unit(s) SubCutaneous every 8 hours  insulin glargine Injectable (LANTUS) 25 Unit(s) SubCutaneous <User Schedule>  insulin lispro (ADMELOG) corrective regimen sliding scale   SubCutaneous at bedtime  insulin lispro (ADMELOG) corrective regimen sliding scale   SubCutaneous three times a day before meals  insulin lispro Injectable (ADMELOG) 8 Unit(s) SubCutaneous three times a day before meals  ketotifen 0.025% Ophthalmic Solution 1 Drop(s) Both EYES two times a day  lisinopril 5 milliGRAM(s) Oral daily  metoprolol succinate ER 25 milliGRAM(s) Oral daily  oxybutynin 5 milliGRAM(s) Oral two times a day  ticagrelor 90 milliGRAM(s) Oral every 12 hours    MEDICATIONS  (PRN):      Vital Signs Last 24 Hrs  T(C): 36.9 (02 Jan 2022 12:13), Max: 36.9 (02 Jan 2022 12:13)  T(F): 98.4 (02 Jan 2022 12:13), Max: 98.4 (02 Jan 2022 12:13)  HR: 77 (02 Jan 2022 15:31) (66 - 88)  BP: 126/73 (02 Jan 2022 15:31) (110/72 - 131/82)  BP(mean): --  RR: 18 (02 Jan 2022 12:13) (16 - 22)  SpO2: 97% (02 Jan 2022 12:13) (92% - 97%)  CAPILLARY BLOOD GLUCOSE      POCT Blood Glucose.: 139 mg/dL (02 Jan 2022 16:31)  POCT Blood Glucose.: 321 mg/dL (02 Jan 2022 11:19)  POCT Blood Glucose.: 212 mg/dL (02 Jan 2022 07:41)  POCT Blood Glucose.: 234 mg/dL (01 Jan 2022 21:35)    I&O's Summary    01 Jan 2022 07:01  -  02 Jan 2022 07:00  --------------------------------------------------------  IN: 660 mL / OUT: 850 mL / NET: -190 mL    02 Jan 2022 07:01  -  02 Jan 2022 17:04  --------------------------------------------------------  IN: 560 mL / OUT: 0 mL / NET: 560 mL          PHYSICAL EXAM:   GENERAL: NAD,    HEAD:  Atraumatic, Normocephalic  EYES:   conjunctiva and sclera clear  NECK: Supple,    CHEST/LUNG: Clear to auscultation bilaterally; No wheeze  HEART: S1S2 normal. Regular rate and rhythm; No murmurs, rubs, or gallops  ABDOMEN: Soft, Nontender, Nondistended; Bowel sounds present  EXTREMITIES:   No clubbing, cyanosis, or edema  PSYCH/Neuro: AAOx3. Non-focal.   SKIN: right wrist cath site pulses intact.       LABS:                        13.8   6.62  )-----------( 278      ( 02 Jan 2022 06:31 )             41.1     01-02    137  |  102  |  13  ----------------------------<  222<H>  4.0   |  21<L>  |  0.68    Ca    9.1      02 Jan 2022 06:30  Phos  4.1     01-02  Mg     1.9     01-02    TPro  6.9  /  Alb  4.0  /  TBili  0.5  /  DBili  x   /  AST  15  /  ALT  11  /  AlkPhos  80  01-02    PT/INR - ( 31 Dec 2021 17:57 )   PT: 12.2 sec;   INR: 1.02 ratio         PTT - ( 31 Dec 2021 17:57 )  PTT:49.0 sec  CARDIAC MARKERS ( 01 Jan 2022 06:08 )  x     / x     / 399 U/L / x     / 26.0 ng/mL  CARDIAC MARKERS ( 01 Jan 2022 00:25 )  x     / x     / 437 U/L / x     / 35.3 ng/mL  CARDIAC MARKERS ( 31 Dec 2021 18:01 )  x     / x     / 412 U/L / x     / 38.1 ng/mL        < from: TTE with Doppler (w/Cont) (01.01.22 @ 08:30) >  Conclusions:  Endocardial visualization enhanced with intravenous  injection of Ultrasonic Enhancing Agent (Definity).  Normal left ventricular systolic function. The basal  inferior wall is akinetic.    < end of copied text >    telemetry reviewed: sinus 60-90's.     RADIOLOGY & ADDITIONAL TESTS:    Imaging Personally Reviewed:  echo  Consultant(s) Notes Reviewed:  cards, endo  Care Discussed with Consultants/Other Providers:

## 2022-01-02 NOTE — PROGRESS NOTE ADULT - ASSESSMENT
65 yo man PMHx of CAD s/p prior PCI, HTN, and DM (A1c 11.9) who presented w/ chest pain 12/31, EKG w/ IWSTEMI s/p KINDRA to 100% RCA lesion and RPDA, followed by staged PCI of LAD lesion.    #IWSTEMI  #CAD  - DAPT w/ ASA 81mg daily and brilinta 90mg BID  - Switch metoprolol tartrate 12.5mg BID to toprol XL 25mg daily  - Start lisinopril 5mg daily  - High intensity lipitor 80mg qhs     Chung Garza MD  PGY5 Cardiology

## 2022-01-02 NOTE — PROGRESS NOTE ADULT - PROBLEM SELECTOR PLAN 5
gdmt as tolerated  ventriculogram with ef of 40%, tte w/ preserved ef.
gdmt as tolerated  ventriculogram with ef of 40%, tte w/ preserved ef.

## 2022-01-02 NOTE — PROGRESS NOTE ADULT - PROBLEM SELECTOR PLAN 2
Endo consult appreciated  lantus 25 units qhs  ademelog 8 units before meals.   lssi  dm diet  also with history of emily; can use home cpap.

## 2022-01-02 NOTE — PROGRESS NOTE ADULT - PROBLEM SELECTOR PLAN 4
TSH low.  Ft4 and Ft3 ordered.  Subclinical hyperthyroidism vs euthyroid sick syndrome vs t3 thyrotoxicosis (less likely)  -f/u endo recs.
TSH low  Ft4 and Ft3 ordered.  Subclinical hyperthyroidism vs euthyroid sick syndrome vs t3 thyrotoxicosis (less likely)

## 2022-01-02 NOTE — PROGRESS NOTE ADULT - SUBJECTIVE AND OBJECTIVE BOX
CARDIOLOGY FELLOW PROGRESS NOTE    Subjective:    No acute events overnight.     Tele: NSR    ROS negative.     Current Medications:   artificial  tears Solution 1 Drop(s) Both EYES two times a day  aspirin enteric coated 81 milliGRAM(s) Oral daily  atorvastatin 80 milliGRAM(s) Oral at bedtime  chlorhexidine 2% Cloths 1 Application(s) Topical daily  dextrose 40% Gel 15 Gram(s) Oral once  dextrose 50% Injectable 25 Gram(s) IV Push once  dextrose 50% Injectable 12.5 Gram(s) IV Push once  dextrose 50% Injectable 25 Gram(s) IV Push once  glucagon  Injectable 1 milliGRAM(s) IntraMuscular once  heparin   Injectable 5000 Unit(s) SubCutaneous every 8 hours  insulin glargine Injectable (LANTUS) 25 Unit(s) SubCutaneous <User Schedule>  insulin lispro (ADMELOG) corrective regimen sliding scale   SubCutaneous at bedtime  insulin lispro (ADMELOG) corrective regimen sliding scale   SubCutaneous three times a day before meals  insulin lispro Injectable (ADMELOG) 8 Unit(s) SubCutaneous three times a day before meals  ketotifen 0.025% Ophthalmic Solution 1 Drop(s) Both EYES two times a day  metoprolol tartrate 12.5 milliGRAM(s) Oral two times a day  oxybutynin 5 milliGRAM(s) Oral two times a day  ticagrelor 90 milliGRAM(s) Oral every 12 hours    Physical Exam:  T(F): 98.4 (), Max: 98.7 ()  HR: 81 () (66 - 88)  BP: 120/80 () (105/61 - 150/68)  BP(mean): 77 () (77 - 77)  ABP: --  ABP(mean): --  RR: 18 (-)  SpO2: 97% (-)  GENERAL: No acute distress, well-developed  HEAD:  Atraumatic, Normocephalic  ENT: EOMI, PERRLA, conjunctiva and sclera clear, Neck supple, No JVD, moist mucosa  CHEST/LUNG: Clear to auscultation bilaterally; No wheeze, equal breath sounds bilaterally   BACK: No spinal tenderness  HEART: Regular rate and rhythm; No murmurs, rubs, or gallops  ABDOMEN: Soft, Nontender, Nondistended; Bowel sounds present  EXTREMITIES:  No clubbing, cyanosis, or edema. R radial artery 2+, no hematoma, tenderness  PSYCH: Nl behavior, nl affect  NEUROLOGY: AAOx3, non-focal, cranial nerves intact  SKIN: Normal color, No rashes or lesions    Cardiovascular Diagnostic Testing: personally reviewed    CXR: Personally reviewed    Labs: Personally reviewed                        13.8   6.62  )-----------( 278      ( 2022 06:31 )             41.1     -    137  |  102  |  13  ----------------------------<  222<H>  4.0   |  21<L>  |  0.68    Ca    9.1      2022 06:30  Phos  4.1       Mg     1.9         TPro  6.9  /  Alb  4.0  /  TBili  0.5  /  DBili  x   /  AST  15  /  ALT  11  /  AlkPhos  80  0102    PT/INR - ( 31 Dec 2021 17:57 )   PT: 12.2 sec;   INR: 1.02 ratio         PTT - ( 31 Dec 2021 17:57 )  PTT:49.0 sec    CARDIAC MARKERS ( 2022 06:08 )  616 ng/L / x     / x     / 399 U/L / x     / 26.0 ng/mL  CARDIAC MARKERS ( 2022 00:25 )  918 ng/L / x     / x     / 437 U/L / x     / 35.3 ng/mL  CARDIAC MARKERS ( 31 Dec 2021 18:01 )  739 ng/L / x     / x     / 412 U/L / x     / 38.1 ng/mL        Serum Pro-Brain Natriuretic Peptide: 258 pg/mL ( @ 18:01)  Serum Pro-Brain Natriuretic Peptide: 114 pg/mL ( @ 12:21)    Total Cholesterol: 191  LDL: --  HDL: 41  T      Thyroid Stimulating Hormone, Serum: 0.09 uIU/mL ( @ 21:37)

## 2022-01-02 NOTE — CHART NOTE - NSCHARTNOTEFT_GEN_A_CORE
66M history of HTN, uncontrolled T2DM c/b CAD, prostate Ca presenting for inferior wall STEMI, sp cath with plan for staging PCI to LAD on 1/3.     #Stress hyperglycemia   #type 2 DM c/b CAD   #CAD s/p CATH with PCI to LAD  A1c 11.9%, Goal < 7%  Home medications: Metformin 1000 mg BID. He works at night, so takes Lantus 25 units in the AM before he sleeps in the day. He states that if his FS before bed are > 200s, he takes another dose of Lantus 25 units when he wakes up    PLAN:  - BG goal 140-180 while in-patient   - BG remains uncontrolled   - Increase Lantus 25 U HS, give NPH 7 U STAT   - Increase Lispro to 8 U TID with meals   - mod scale ss with meals and low dose ss at night     #Subclinical hyperthyroidism   -vitals stable  -TSH suppressed at 0.09 and FT4 wnl at 2.52.  NO prior labs to compare   -pt is under 67, unsure of OP, but has cardiac risks and TSH <0.1.  Would recommend repeating the labs in 2-3 months to confirm.    -meantime can check TSI, TSHrAb and thyroid u/s (can also be done as OP).     Shey Rodriguez MD  Attending Physician   Department of Endocrinology, Diabetes and Metabolism   Cell: [phone]    If before 9AM or after 5PM, or on weekends/holidays, please call the Endocrine answering service for assistance ([phone]).  For nonurgent matters, please email [email or web address] for assistance.

## 2022-01-03 ENCOUNTER — TRANSCRIPTION ENCOUNTER (OUTPATIENT)
Age: 67
End: 2022-01-03

## 2022-01-03 VITALS — WEIGHT: 226.41 LBS

## 2022-01-03 LAB
GLUCOSE BLDC GLUCOMTR-MCNC: 206 MG/DL — HIGH (ref 70–99)
GLUCOSE BLDC GLUCOMTR-MCNC: 223 MG/DL — HIGH (ref 70–99)

## 2022-01-03 PROCEDURE — 93308 TTE F-UP OR LMTD: CPT | Mod: 26

## 2022-01-03 PROCEDURE — 93321 DOPPLER ECHO F-UP/LMTD STD: CPT | Mod: 26

## 2022-01-03 PROCEDURE — 99239 HOSP IP/OBS DSCHRG MGMT >30: CPT | Mod: 25

## 2022-01-03 PROCEDURE — 84484 ASSAY OF TROPONIN QUANT: CPT

## 2022-01-03 PROCEDURE — C1753: CPT

## 2022-01-03 PROCEDURE — 83735 ASSAY OF MAGNESIUM: CPT

## 2022-01-03 PROCEDURE — 82553 CREATINE MB FRACTION: CPT

## 2022-01-03 PROCEDURE — C1874: CPT

## 2022-01-03 PROCEDURE — 36415 COLL VENOUS BLD VENIPUNCTURE: CPT

## 2022-01-03 PROCEDURE — 83880 ASSAY OF NATRIURETIC PEPTIDE: CPT

## 2022-01-03 PROCEDURE — 86850 RBC ANTIBODY SCREEN: CPT

## 2022-01-03 PROCEDURE — U0005: CPT

## 2022-01-03 PROCEDURE — 99233 SBSQ HOSP IP/OBS HIGH 50: CPT

## 2022-01-03 PROCEDURE — C8929: CPT

## 2022-01-03 PROCEDURE — 84481 FREE ASSAY (FT-3): CPT

## 2022-01-03 PROCEDURE — 86901 BLOOD TYPING SEROLOGIC RH(D): CPT

## 2022-01-03 PROCEDURE — 82962 GLUCOSE BLOOD TEST: CPT

## 2022-01-03 PROCEDURE — 86900 BLOOD TYPING SEROLOGIC ABO: CPT

## 2022-01-03 PROCEDURE — 83605 ASSAY OF LACTIC ACID: CPT

## 2022-01-03 PROCEDURE — 83036 HEMOGLOBIN GLYCOSYLATED A1C: CPT

## 2022-01-03 PROCEDURE — 94660 CPAP INITIATION&MGMT: CPT

## 2022-01-03 PROCEDURE — 92978 ENDOLUMINL IVUS OCT C 1ST: CPT | Mod: RC

## 2022-01-03 PROCEDURE — 85730 THROMBOPLASTIN TIME PARTIAL: CPT

## 2022-01-03 PROCEDURE — 93321 DOPPLER ECHO F-UP/LMTD STD: CPT

## 2022-01-03 PROCEDURE — 93005 ELECTROCARDIOGRAM TRACING: CPT

## 2022-01-03 PROCEDURE — 82550 ASSAY OF CK (CPK): CPT

## 2022-01-03 PROCEDURE — 99152 MOD SED SAME PHYS/QHP 5/>YRS: CPT

## 2022-01-03 PROCEDURE — 85610 PROTHROMBIN TIME: CPT

## 2022-01-03 PROCEDURE — C9600: CPT | Mod: LD

## 2022-01-03 PROCEDURE — C1725: CPT

## 2022-01-03 PROCEDURE — 80061 LIPID PANEL: CPT

## 2022-01-03 PROCEDURE — C1769: CPT

## 2022-01-03 PROCEDURE — 85025 COMPLETE CBC W/AUTO DIFF WBC: CPT

## 2022-01-03 PROCEDURE — 84100 ASSAY OF PHOSPHORUS: CPT

## 2022-01-03 PROCEDURE — C8924: CPT

## 2022-01-03 PROCEDURE — 99153 MOD SED SAME PHYS/QHP EA: CPT

## 2022-01-03 PROCEDURE — 84443 ASSAY THYROID STIM HORMONE: CPT

## 2022-01-03 PROCEDURE — 93454 CORONARY ARTERY ANGIO S&I: CPT | Mod: 59

## 2022-01-03 PROCEDURE — C1887: CPT

## 2022-01-03 PROCEDURE — 80053 COMPREHEN METABOLIC PANEL: CPT

## 2022-01-03 PROCEDURE — C9606: CPT | Mod: RC

## 2022-01-03 PROCEDURE — U0003: CPT

## 2022-01-03 PROCEDURE — C1894: CPT

## 2022-01-03 RX ORDER — DULAGLUTIDE 4.5 MG/.5ML
0.75 INJECTION, SOLUTION SUBCUTANEOUS
Qty: 1 | Refills: 0
Start: 2022-01-03 | End: 2022-04-02

## 2022-01-03 RX ORDER — REPAGLINIDE 1 MG/1
1 TABLET ORAL
Qty: 90 | Refills: 0
Start: 2022-01-03 | End: 2022-02-01

## 2022-01-03 RX ORDER — METFORMIN HYDROCHLORIDE 850 MG/1
2 TABLET ORAL
Qty: 60 | Refills: 0
Start: 2022-01-03 | End: 2022-02-01

## 2022-01-03 RX ORDER — ATORVASTATIN CALCIUM 80 MG/1
1 TABLET, FILM COATED ORAL
Qty: 30 | Refills: 0
Start: 2022-01-03 | End: 2022-02-01

## 2022-01-03 RX ORDER — AMLODIPINE BESYLATE 2.5 MG/1
1 TABLET ORAL
Qty: 0 | Refills: 0 | DISCHARGE

## 2022-01-03 RX ORDER — METFORMIN HYDROCHLORIDE 850 MG/1
2 TABLET ORAL
Qty: 0 | Refills: 0 | DISCHARGE

## 2022-01-03 RX ORDER — LISINOPRIL 2.5 MG/1
1 TABLET ORAL
Qty: 30 | Refills: 0
Start: 2022-01-03 | End: 2022-02-01

## 2022-01-03 RX ORDER — TICAGRELOR 90 MG/1
1 TABLET ORAL
Qty: 60 | Refills: 0
Start: 2022-01-03 | End: 2022-02-01

## 2022-01-03 RX ORDER — INSULIN GLARGINE 100 [IU]/ML
28 INJECTION, SOLUTION SUBCUTANEOUS AT BEDTIME
Refills: 0 | Status: DISCONTINUED | OUTPATIENT
Start: 2022-01-03 | End: 2022-01-03

## 2022-01-03 RX ORDER — ISOPROPYL ALCOHOL, BENZOCAINE .7; .06 ML/ML; ML/ML
1 SWAB TOPICAL
Qty: 100 | Refills: 1
Start: 2022-01-03 | End: 2022-02-21

## 2022-01-03 RX ORDER — METOPROLOL TARTRATE 50 MG
1 TABLET ORAL
Qty: 30 | Refills: 0
Start: 2022-01-03 | End: 2022-02-01

## 2022-01-03 RX ORDER — ASPIRIN/CALCIUM CARB/MAGNESIUM 324 MG
1 TABLET ORAL
Qty: 0 | Refills: 0 | DISCHARGE

## 2022-01-03 RX ORDER — KETOTIFEN FUMARATE 0.34 MG/ML
1 SOLUTION OPHTHALMIC
Qty: 1 | Refills: 0
Start: 2022-01-03 | End: 2022-02-01

## 2022-01-03 RX ORDER — ENOXAPARIN SODIUM 100 MG/ML
28 INJECTION SUBCUTANEOUS
Qty: 1 | Refills: 0
Start: 2022-01-03 | End: 2022-02-01

## 2022-01-03 RX ORDER — INSULIN LISPRO 100/ML
10 VIAL (ML) SUBCUTANEOUS
Refills: 0 | Status: DISCONTINUED | OUTPATIENT
Start: 2022-01-03 | End: 2022-01-03

## 2022-01-03 RX ORDER — ASPIRIN/CALCIUM CARB/MAGNESIUM 324 MG
1 TABLET ORAL
Qty: 30 | Refills: 0
Start: 2022-01-03 | End: 2022-02-01

## 2022-01-03 RX ORDER — ATORVASTATIN CALCIUM 80 MG/1
1 TABLET, FILM COATED ORAL
Qty: 0 | Refills: 0 | DISCHARGE

## 2022-01-03 RX ADMIN — HEPARIN SODIUM 5000 UNIT(S): 5000 INJECTION INTRAVENOUS; SUBCUTANEOUS at 14:22

## 2022-01-03 RX ADMIN — Medication 25 MILLIGRAM(S): at 06:04

## 2022-01-03 RX ADMIN — Medication 4: at 11:55

## 2022-01-03 RX ADMIN — LISINOPRIL 5 MILLIGRAM(S): 2.5 TABLET ORAL at 06:05

## 2022-01-03 RX ADMIN — HEPARIN SODIUM 5000 UNIT(S): 5000 INJECTION INTRAVENOUS; SUBCUTANEOUS at 06:04

## 2022-01-03 RX ADMIN — Medication 5 MILLIGRAM(S): at 06:04

## 2022-01-03 RX ADMIN — Medication 4: at 07:41

## 2022-01-03 RX ADMIN — Medication 8 UNIT(S): at 11:53

## 2022-01-03 RX ADMIN — Medication 81 MILLIGRAM(S): at 11:53

## 2022-01-03 RX ADMIN — KETOTIFEN FUMARATE 1 DROP(S): 0.34 SOLUTION OPHTHALMIC at 06:05

## 2022-01-03 RX ADMIN — Medication 1 DROP(S): at 06:05

## 2022-01-03 RX ADMIN — TICAGRELOR 90 MILLIGRAM(S): 90 TABLET ORAL at 11:52

## 2022-01-03 RX ADMIN — Medication 8 UNIT(S): at 07:42

## 2022-01-03 NOTE — DISCHARGE NOTE PROVIDER - NSDCPNSUBOBJ_GEN_ALL_CORE
AxOx3  NSR  [id number]  Lungs CTA on room air  ab soft + Bm  Voids  extremities RT radial site benign  equal strength throughout   warm well perfused   b/ll e warm well perfused + DP    T(C): 36.4 (01-03-22 @ 11:39), Max: 37.1 (01-02-22 @ 19:06)  T(F): 97.6 (01-03-22 @ 11:39), Max: 98.7 (01-02-22 @ 19:06)  HR: 78 (01-03-22 @ 11:39) (68 - 82)  BP: 101/70 (01-03-22 @ 11:39) (101/70 - 132/92)  RR: 18 (01-03-22 @ 11:39) (18 - 18)  SpO2: 96% (01-03-22 @ 11:39) (96% - 100%)                        13.8   6.62  )-----------( 278      ( 02 Jan 2022 06:31 )             41.1     01-02    137  |  102  |  13  ----------------------------<  222<H>  4.0   |  21<L>  |  0.68    Ca    9.1      02 Jan 2022 06:30  Phos  4.1     01-02  Mg     1.9     01-02    TPro  6.9  /  Alb  4.0  /  TBili  0.5  /  DBili  x   /  AST  15  /  ALT  11  /  AlkPhos  80  01-02  Greater then 45 minute spent on discharge

## 2022-01-03 NOTE — DIETITIAN INITIAL EVALUATION ADULT. - PERTINENT MEDS FT
MEDICATIONS  (STANDING):  artificial  tears Solution 1 Drop(s) Both EYES two times a day  aspirin enteric coated 81 milliGRAM(s) Oral daily  atorvastatin 80 milliGRAM(s) Oral at bedtime  chlorhexidine 2% Cloths 1 Application(s) Topical daily  dextrose 40% Gel 15 Gram(s) Oral once  dextrose 50% Injectable 25 Gram(s) IV Push once  dextrose 50% Injectable 12.5 Gram(s) IV Push once  dextrose 50% Injectable 25 Gram(s) IV Push once  glucagon  Injectable 1 milliGRAM(s) IntraMuscular once  heparin   Injectable 5000 Unit(s) SubCutaneous every 8 hours  insulin glargine Injectable (LANTUS) 25 Unit(s) SubCutaneous <User Schedule>  insulin lispro (ADMELOG) corrective regimen sliding scale   SubCutaneous at bedtime  insulin lispro (ADMELOG) corrective regimen sliding scale   SubCutaneous three times a day before meals  insulin lispro Injectable (ADMELOG) 8 Unit(s) SubCutaneous three times a day before meals  ketotifen 0.025% Ophthalmic Solution 1 Drop(s) Both EYES two times a day  lisinopril 5 milliGRAM(s) Oral daily  metoprolol succinate ER 25 milliGRAM(s) Oral daily  oxybutynin 5 milliGRAM(s) Oral two times a day  ticagrelor 90 milliGRAM(s) Oral every 12 hours

## 2022-01-03 NOTE — DISCHARGE NOTE PROVIDER - CARE PROVIDER_API CALL
Kim Huerta)  Cardiology; Internal Medicine  [address] [place] [address], [address]  [place], [place] [id number]  Phone: [phone]  Fax: [phone]  Follow Up Time: 1 week    Adebayo Pagan)  Internal Medicine; Nephrology  [address] [place] [address]  Phone: [phone]  Fax: [phone]  Follow Up Time: 1 week

## 2022-01-03 NOTE — PROGRESS NOTE ADULT - NUTRITIONAL ASSESSMENT
Diet, Consistent Carbohydrate/No Snacks (01-01-22 @ 12:21) [Active]      Needs RD consult if possible

## 2022-01-03 NOTE — PROGRESS NOTE ADULT - NSPROGADDITIONALINFOA_GEN_ALL_CORE
-Plan discussed with pt/team NP.  Contact info: [phone] (24/7). pager [phone]  [email or web address] password [email or web address]  Spent over 35 minutes providing face to face education which was more than 50% of encounter that also included assessing pt/labs/meds and discussing plan of care with primary team.  Adjusting insulin  Discharge plan  Follow up care
Surrogates of perfusion are stable.

## 2022-01-03 NOTE — DISCHARGE NOTE NURSING/CASE MANAGEMENT/SOCIAL WORK - PATIENT PORTAL LINK FT
You can access the FollowMyHealth Patient Portal offered by [facility] by registering at the following website: [email or web address]. By joining [facility]’s FollowMyHealth portal, you will also be able to view your health information using other applications (apps) compatible with our system.

## 2022-01-03 NOTE — DISCHARGE NOTE PROVIDER - PROVIDER TOKENS
PROVIDER:[TOKEN:[[id number]],FOLLOWUP:[1 week]],PROVIDER:[TOKEN:[[id number]],FOLLOWUP:[1 week]]

## 2022-01-03 NOTE — DISCHARGE NOTE PROVIDER - NSDCCPCAREPLAN_GEN_ALL_CORE_FT
PRINCIPAL DISCHARGE DIAGNOSIS  Diagnosis: History of percutaneous coronary intervention  Assessment and Plan of Treatment: Follow up with Dr Huerta in one-two weeks call for appointment  [phone]  Follow up with your PCP Dr Pagan with two weeks  take all medications as precribed  no heavy lifting no repative motions no bathing   daily shower   Call Dr. Huerta for bleeding  tingling loss of sensations of right arm       SECONDARY DISCHARGE DIAGNOSES  Diagnosis: Diabetes  Assessment and Plan of Treatment: Take all medications as prescribed   check blood glucose prior to meals and bedtime record number bring with you to all doctor appointments  Follow up with Endocrine- Dr Pagan PCP to address  Thyroid panel  and ultraound in 2 months       PRINCIPAL DISCHARGE DIAGNOSIS  Diagnosis: History of percutaneous coronary intervention  Assessment and Plan of Treatment: Follow up with Dr Huerta in one-two weeks call for appointment  [phone]  Follow up with your PCP Dr Pagan with two weeks  take all medications as precribed  no heavy lifting no repative motions no bathing   daily shower   Call Dr. Huerta for bleeding  tingling loss of sensations of right arm       SECONDARY DISCHARGE DIAGNOSES  Diagnosis: Diabetes  Assessment and Plan of Treatment: Take all medications as prescribed   check blood glucose prior to meals and bedtime record number bring with you to all doctor appointments  Follow up with Endocrine- Dr Pagan PCP to address  TSI, TSHrAb and thyroid u/s to be done as outpatient

## 2022-01-03 NOTE — PROGRESS NOTE ADULT - SUBJECTIVE AND OBJECTIVE BOX
Patient seen and examined at bedside.    Overnight Events:     No AEON     Denies any chest pain, SOB, palpitations     Review Of Systems: No chest pain, shortness of breath, or palpitations            Current Meds:  artificial  tears Solution 1 Drop(s) Both EYES two times a day  aspirin enteric coated 81 milliGRAM(s) Oral daily  atorvastatin 80 milliGRAM(s) Oral at bedtime  chlorhexidine 2% Cloths 1 Application(s) Topical daily  dextrose 40% Gel 15 Gram(s) Oral once  dextrose 50% Injectable 25 Gram(s) IV Push once  dextrose 50% Injectable 12.5 Gram(s) IV Push once  dextrose 50% Injectable 25 Gram(s) IV Push once  glucagon  Injectable 1 milliGRAM(s) IntraMuscular once  heparin   Injectable 5000 Unit(s) SubCutaneous every 8 hours  insulin glargine Injectable (LANTUS) 25 Unit(s) SubCutaneous <User Schedule>  insulin lispro (ADMELOG) corrective regimen sliding scale   SubCutaneous three times a day before meals  insulin lispro (ADMELOG) corrective regimen sliding scale   SubCutaneous at bedtime  insulin lispro Injectable (ADMELOG) 8 Unit(s) SubCutaneous three times a day before meals  ketotifen 0.025% Ophthalmic Solution 1 Drop(s) Both EYES two times a day  lisinopril 5 milliGRAM(s) Oral daily  metoprolol succinate ER 25 milliGRAM(s) Oral daily  oxybutynin 5 milliGRAM(s) Oral two times a day  ticagrelor 90 milliGRAM(s) Oral every 12 hours      Vitals:  T(F): 97.7 (01-03), Max: 98.7 (01-02)  HR: 82 (01-03) (73 - 88)  BP: 132/92 (01-03) (111/74 - 132/92)  RR: 18 (01-03)  SpO2: 100% (01-03)  I&O's Summary    01 Jan 2022 07:01  -  02 Jan 2022 07:00  --------------------------------------------------------  IN: 660 mL / OUT: 850 mL / NET: -190 mL    02 Jan 2022 07:01  -  03 Jan 2022 06:13  --------------------------------------------------------  IN: 800 mL / OUT: 950 mL / NET: -150 mL        Physical Exam:  Appearance: No acute distress   Cardiovascular: RRR, S1, S2, no murmurs, rubs, or gallops; no edema; no JVD  Respiratory: Clear to auscultation bilaterally  Musculoskeletal: R wrist no noted ecchymosis or hematoma   Neurologic: Non-focal                            13.8   6.62  )-----------( 278      ( 02 Jan 2022 06:31 )             41.1     01-02    137  |  102  |  13  ----------------------------<  222<H>  4.0   |  21<L>  |  0.68    Ca    9.1      02 Jan 2022 06:30  Phos  4.1     01-02  Mg     1.9     01-02    TPro  6.9  /  Alb  4.0  /  TBili  0.5  /  DBili  x   /  AST  15  /  ALT  11  /  AlkPhos  80  01-02      CARDIAC MARKERS ( 01 Jan 2022 06:08 )  616 ng/L / x     / x     / 399 U/L / x     / 26.0 ng/mL  CARDIAC MARKERS ( 01 Jan 2022 00:25 )  918 ng/L / x     / x     / 437 U/L / x     / 35.3 ng/mL  CARDIAC MARKERS ( 31 Dec 2021 18:01 )  739 ng/L / x     / x     / 412 U/L / x     / 38.1 ng/mL      Serum Pro-Brain Natriuretic Peptide: 258 pg/mL (12-31 @ 18:01)  Serum Pro-Brain Natriuretic Peptide: 114 pg/mL (12-31 @ 12:21)          New ECG(s): Personally reviewed    Echo:    Stress Testing:     Cath:    Imaging:    Interpretation of Telemetry:

## 2022-01-03 NOTE — DIETITIAN INITIAL EVALUATION ADULT. - ORAL INTAKE PTA/DIET HISTORY
visited pt at bedside this afternoon. tries to monitor carbohydrates PTA. Checks blood sugar daily (once-twice) but states that he will increase amount he checks daily for better glycemic control. is on metformin. Confirms NKFA.

## 2022-01-03 NOTE — DIETITIAN INITIAL EVALUATION ADULT. - PERTINENT LABORATORY DATA
01-02 Na n/a   Glu n/a   K+ n/a   Cr  n/a   BUN n/a   Phos n/a   Alb n/a   PAB n/a   Hgb 13.8 g/dL Hct 41.1 %  POCT Blood Glucose.: 223 mg/dL (01-03-22 @ 11:11)  POCT Blood Glucose.: 206 mg/dL (01-03-22 @ 07:20)  POCT Blood Glucose.: 166 mg/dL (01-02-22 @ 21:41)  POCT Blood Glucose.: 139 mg/dL (01-02-22 @ 16:31)  A1C with Estimated Average Glucose Result: 11.9 % (12-31-21 @ 22:31)

## 2022-01-03 NOTE — DIETITIAN INITIAL EVALUATION ADULT. - ADD RECOMMEND
1) Will continue to monitor PO intake, weight, labs, skin, GI status and diet 2) Add DASH diet restriction due to PMHx 3) Consider addition of vitamin C if no contraindications. A daily dose of >/= 500mg vitamin C has been shown to lower elevated blood glucose levels in people with diabetes

## 2022-01-03 NOTE — DIETITIAN INITIAL EVALUATION ADULT. - OTHER INFO
Pt states to have good PO intake. Denies nausea/vomiting/constipation/diarrhea. Denies difficulty chewing / swallowing. Last bowel movement 12/31 per flow sheets.     Dosing weight 102.7kg 12/31  Weight per flow sheets 106.3kg 1/3

## 2022-01-03 NOTE — PROGRESS NOTE ADULT - PROBLEM SELECTOR PLAN 3
Mild.  Hypovolemic.  Encourage po intake.  Hold off on further ivf.
-LDL goal <70  -Noted lipid panel results>   -Continue high intensity statin.   -F/u as out pt and if not improved needs second agent. Pt at high risk for further CV events. Pt aware
Mild.  Hypovolemic.  Encourage po intake.  Hold of on further ivf.

## 2022-01-03 NOTE — DISCHARGE NOTE NURSING/CASE MANAGEMENT/SOCIAL WORK - NSDCPEFALRISK_GEN_ALL_CORE
For information on Fall & Injury Prevention, visit: [email or web address] OR  [email or web address] OR  https://www.cdc.gov/steadi/patient.html

## 2022-01-03 NOTE — DISCHARGE NOTE PROVIDER - NSDCMRMEDTOKEN_GEN_ALL_CORE_FT
alcohol swabs : Apply topically to affected area 4 times a day   aspirin 81 mg oral delayed release tablet: 1 tab(s) orally once a day  atorvastatin 80 mg oral tablet: 1 tab(s) orally once a day (at bedtime)  clomiPRAMINE 25 mg oral capsule: orally once a day, As Needed  Insulin Pen Needles, 4mm: 1 application subcutaneously 4 times a day. ** Use with insulin pen **   ketotifen 0.025% ophthalmic solution: 1 drop(s) to each affected eye 2 times a day  lancets: 1 application subcutaneously 4 times a day   Lantus Solostar Pen 100 units/mL subcutaneous solution: 28 unit(s) subcutaneous once a day (at bedtime)   lisinopril 5 mg oral tablet: 1 tab(s) orally once a day  metFORMIN 500 mg oral tablet: 2 tab(s) orally once a day  metoprolol succinate 25 mg oral tablet, extended release: 1 tab(s) orally once a day  ocular lubricant ophthalmic solution: 1 drop(s) to each affected eye 2 times a day  Prandin 1 mg oral tablet: 1 tab(s) orally 3 times a day (before meals)  check blood sugar prior meal hold if glucose &lt;less then 100 or NPO or missing meal    solifenacin 5 mg oral tablet: 1 tab(s) orally once a day  test strips (per patient&#x27;s insurance): 1 application subcutaneously 4 times a day. ** Compatible with patient&#x27;s glucometer **  ticagrelor 90 mg oral tablet: 1 tab(s) orally every 12 hours  Trulicity Pen 0.75 mg/0.5 mL subcutaneous solution: 0.75 milligram(s) subcutaneously once a week

## 2022-01-03 NOTE — DIETITIAN INITIAL EVALUATION ADULT. - PERSON TAUGHT/METHOD
Provided pt with carbohydrate counting packet. discussed types of carbohydrates, amount recommended per meal and importance of pairing carbohydrates with protein/verbal instruction/written material/patient instructed/teach back - (Patient repeats in own words)

## 2022-01-03 NOTE — DIETITIAN INITIAL EVALUATION ADULT. - CHIEF COMPLAINT
H&P: 66M history of HTN, DM, prostate ca, reported PCI in past (?) who presented to [facility] with CP since yesterday.

## 2022-01-03 NOTE — PROGRESS NOTE ADULT - SUBJECTIVE AND OBJECTIVE BOX
DIABETES FOLLOW UP NOTE: Saw pt earlier today  INTERVAL HX: Pt stable, reports tolerating POs with BGs variable  between 100s to 300s depending on PO intake. Also fasting hyperglycemia >pt reports he is not eating any food after dinner or overnight.  No hypoglycemia. Feels well and has no complaints. Possible discharge home today.       Review of Systems:  General: As above  Cardiovascular: No chest pain, palpitations  Respiratory: No SOB, no cough  GI: No nausea, vomiting, abdominal pain  Endocrine: No polyuria, polydipsia or S&Sx of hypoglycemia    Allergies    No Known Allergies    Intolerances      MEDICATIONS:  atorvastatin 80 milliGRAM(s) Oral at bedtime  insulin glargine Injectable (LANTUS) 25 Unit(s) SubCutaneous <User Schedule>  insulin lispro (ADMELOG) corrective regimen sliding scale   SubCutaneous at bedtime  insulin lispro (ADMELOG) corrective regimen sliding scale   SubCutaneous three times a day before meals  insulin lispro Injectable (ADMELOG) 8 Unit(s) SubCutaneous three times a day before meals      PHYSICAL EXAM:  VITALS: T(C): 36.4 (01-03-22 @ 11:39)  T(F): 97.6 (01-03-22 @ 11:39), Max: 98.7 (01-02-22 @ 19:06)  HR: 78 (01-03-22 @ 11:39) (68 - 82)  BP: 101/70 (01-03-22 @ 11:39) (101/70 - 132/92)  RR:  (18 - 18)  SpO2:  (96% - 100%)  Wt(kg): --  GENERAL: Male laying in bed in NAD  Abdomen: Soft, nontender, non distended, obese  Extremities: Warm, no edema in all 4 exts  NEURO: A&O X3    LABS:  POCT Blood Glucose.: 223 mg/dL (01-03-22 @ 11:11)  POCT Blood Glucose.: 206 mg/dL (01-03-22 @ 07:20)  POCT Blood Glucose.: 166 mg/dL (01-02-22 @ 21:41)  POCT Blood Glucose.: 139 mg/dL (01-02-22 @ 16:31)  POCT Blood Glucose.: 321 mg/dL (01-02-22 @ 11:19)  POCT Blood Glucose.: 212 mg/dL (01-02-22 @ 07:41)  POCT Blood Glucose.: 234 mg/dL (01-01-22 @ 21:35)  POCT Blood Glucose.: 243 mg/dL (01-01-22 @ 16:34)  POCT Blood Glucose.: 376 mg/dL (01-01-22 @ 12:08)  POCT Blood Glucose.: 210 mg/dL (01-01-22 @ 07:31)  POCT Blood Glucose.: 305 mg/dL (12-31-21 @ 22:45)  POCT Blood Glucose.: 243 mg/dL (12-31-21 @ 16:27)                            13.8   6.62  )-----------( 278      ( 02 Jan 2022 06:31 )             41.1       01-02    137  |  102  |  13  ----------------------------<  222<H>  4.0   |  21<L>  |  0.68    EGFR if : 117    Ca    9.1      01-02  Mg     1.9     01-02  Phos  4.1     01-02    TPro  6.9  /  Alb  4.0  /  TBili  0.5  /  DBili  x   /  AST  15  /  ALT  11  /  AlkPhos  80  01-02      Thyroid Function Tests:  12-31 @ 21:37 TSH 0.09 FreeT4 -- T3 -- Anti TPO -- Anti Thyroglobulin Ab -- TSI --      A1C with Estimated Average Glucose Result: 11.9 % (12-31-21 @ 22:31)      Estimated Average Glucose: 295 mg/dL (12-31-21 @ 22:31)        12-31 Chol 191 Direct LDL -- LDL calculated 130<H> HDL 41 Trig 100

## 2022-01-03 NOTE — PROGRESS NOTE ADULT - PROBLEM SELECTOR PLAN 1
-Test BG ac and hs  -Change Lantus dose to 28 units  qhs  -Change Admelog dose to 10 units qAC  -C/w low dose correctional scale qAC and qHS   For Discharge:  -D/c with basal Lantus 28 units+ Metformin 1g bid plus Prandin 1mg ac meals (hold for BG<100 OR not eating) + Trulicity 0.75 mg subq/weekly . Pt works at night but can take Lantus ay 8pm before he start working at 10pm.   -Pt aware that if this DM regimen doesn't work he will need basal/bolus therapy. Pt verbalized understanding.   -Can f/u with pvt enod but provided info of Endocrine Practice at [address] [facility], [address], [place] [address]; Ph # [phone]  -Needs Optho follow up as well
3 rca stents placed  -s/p staged pci to pLAD on 1/1.   gdmt as tolerated  ventriculogram with ef of 40%, tte w/ preserved ef.  c/w high intensity statin  c/w dapt   cardiology care appreciated  -metoprolol xl 25mg daily.   -will add lisinopril 5mg daily.   -c/w telemetry.  -Per CCU notes, plan to repeat echo after cath. F/u with cards if still necessary.
3 rca stents placed  for staged pci to pLAD  gdmt as tolerated  ventriculogram with ef of 40%, tte w/ preserved ef.  c/w high intensity statin  c/w dapt   cardiology care greatly appreciated

## 2022-01-03 NOTE — DISCHARGE NOTE PROVIDER - HOSPITAL COURSE
This is a 66M history of HTN, DM, prostate ca, SHERRY (qhs CPAP) reported PCI in past (?) who presented to [facility] with CP since yesterday, found to have IWSTEMI, load/brillinta txfer to [facility] for cath (12/31), s/p 100% occlusion of RCA s/p DESx3, LAD 80%, plan for staging to LAD on Monday.  12/31  DESx3, s/p PCI/stent to RCA and staged PCI/stent of LAD.  His LVF is preserved.  .     1/3 VSS stable and eager for  discharge :DAPT ASA81 Brilinta 90 BID toprol 25 xl  lisinopril 5 Lipitor 80  to see follow up with Dr Huerta in 1-2 weeks  f/u outpatient with endocrine in 2 months for thyroid US.   This is a 66M history of HTN, DM, prostate ca, SHERRY (qhs CPAP) reported PCI in past (?) who presented to [facility] with CP since yesterday, found to have IWSTEMI, load/brillinta txfer to [facility] for cath (12/31), s/p 100% occlusion of RCA s/p DESx3, LAD 80%, plan for staging to LAD on Monday.  12/31  DESx3, s/p PCI/stent to RCA and staged PCI/stent of LAD.  His LVF is preserved.  .     1/3 VSS stable and eager for  discharge :DAPT ASA81 Brilinta 90 BID toprol 25 xl  lisinopril 5 Lipitor 80  to see follow up with Dr Huerta in 1-2 weeks  f/u outpatient with endocrine for TSI, TSHrAb and thyroid u/s.

## 2022-01-03 NOTE — PROGRESS NOTE ADULT - ASSESSMENT
67 yo man PMHx of CAD s/p prior PCI, HTN, and DM (A1c 11.9) who presented w/ chest pain 12/31, EKG w/ IWSTEMI s/p KINDRA to 100% RCA lesion and RPDA, followed by staged PCI of LAD lesion.    #IWSTEMI  #CAD  - DAPT w/ ASA 81mg daily and brilinta 90mg BID  - Switch metoprolol tartrate 12.5mg BID to toprol XL 25mg daily  - Start lisinopril 5mg daily  - High intensity lipitor 80mg qhs   - Please have patient f/u with Dr. Huerta in 1-2 weeks     Will sign off, please contact us if any questions

## 2022-01-03 NOTE — CHART NOTE - NSCHARTNOTEFT_GEN_A_CORE
Patient seen and examined. no acute complaints   Labs and imaging reviewed.     66M history of HTN, DM, prostate ca, reported PCI in past (states he had 2 stents placed 5 years ago at [facility], but not in records, states he was told he no longer needed blood thinners) who presented to  with CP for 1 day CP woke him up from sleep 12/30 5AM, associated with R arm discomfort. No diaphoresis or SOB or n/v. CP became progressive worse from 6/10- 8/10 so he presented ot the ED. ECG showing SUJIT in inferior leads. Transferred to NS for emergent cath.  patient was loaded with ASA+Brilinta, Atorvastatin, and heparin started.     Pt underwent cath 12/31 with findings of 100% occlusion of RCA s/p DESx3, intracoronary integrelin 19mg given. pLAD 80% occluded. Also given 1 of versed, 25 of fentanyl and 6000U heparin bolus. LVEDP 8-12, ventriculogram EF 40%, given 500cc bolus for hypotension and improved. Post procedure, CP improved to 1/10. s/p staged cath with KINDRA to LAD on 1/2.     ventriculogram showed EF 40%. TTE EF 52% with mild segmental left ventricular systolic dysfunction. No left ventricular thrombus.    The patient was found to have a TSH of 0.09 and free T3 wnl, his a1c was 11.9 (taking metformin 1000qd and ?lantus at home). patient evaluted by Endocrine and recommended d/c with basal Lantus 28 units+ Metformin 1g bid plus Prandin 1mg ac meals (hold for BG<100 OR not eating) + Trulicity 0.75 mg subq/weekly . Pt works at night but can take Lantus ay 8pm before he start working at 10pm.     patient to follow up with pmd - Dr. Adebayo Pagan, endocrine and cardiology in 1-2 weeks.     discharge time - 38 minutes  Dr. M. Luke  Medicine Hospitalist  [phone]

## 2022-01-03 NOTE — DISCHARGE NOTE PROVIDER - NSDCACTIVITY_GEN_ALL_CORE
Return to Work/School allowed/Showering allowed/Stairs allowed/Driving allowed/Walking - Indoors allowed/Walking - Outdoors allowed/Follow Instructions Provided by your Surgical Team

## 2022-05-09 ENCOUNTER — NON-APPOINTMENT (OUTPATIENT)
Age: 67
End: 2022-05-09

## 2022-05-09 ENCOUNTER — APPOINTMENT (OUTPATIENT)
Dept: OPHTHALMOLOGY | Facility: CLINIC | Age: 67
End: 2022-05-09
Payer: COMMERCIAL

## 2022-05-09 PROCEDURE — 92250 FUNDUS PHOTOGRAPHY W/I&R: CPT

## 2022-05-09 PROCEDURE — 92014 COMPRE OPH EXAM EST PT 1/>: CPT

## 2022-05-19 ENCOUNTER — EMERGENCY (EMERGENCY)
Facility: HOSPITAL | Age: 67
LOS: 1 days | Discharge: HOME | End: 2022-05-19
Attending: EMERGENCY MEDICINE
Payer: COMMERCIAL

## 2022-05-19 VITALS
HEIGHT: 69 IN | HEART RATE: 69 BPM | TEMPERATURE: 98 F | WEIGHT: 229.94 LBS | RESPIRATION RATE: 18 BRPM | OXYGEN SATURATION: 98 % | DIASTOLIC BLOOD PRESSURE: 94 MMHG | SYSTOLIC BLOOD PRESSURE: 152 MMHG

## 2022-05-19 DIAGNOSIS — Z98.890 OTHER SPECIFIED POSTPROCEDURAL STATES: Chronic | ICD-10-CM

## 2022-05-19 PROCEDURE — 99284 EMERGENCY DEPT VISIT MOD MDM: CPT

## 2022-05-19 PROCEDURE — 99285 EMERGENCY DEPT VISIT HI MDM: CPT

## 2022-05-19 PROCEDURE — 93010 ELECTROCARDIOGRAM REPORT: CPT

## 2022-05-20 VITALS
DIASTOLIC BLOOD PRESSURE: 74 MMHG | RESPIRATION RATE: 16 BRPM | SYSTOLIC BLOOD PRESSURE: 133 MMHG | HEART RATE: 67 BPM | TEMPERATURE: 98 F | OXYGEN SATURATION: 98 %

## 2022-05-20 LAB
A1C WITH ESTIMATED AVERAGE GLUCOSE RESULT: 7.3 % — HIGH (ref 4–5.6)
ALBUMIN SERPL ELPH-MCNC: 4.2 G/DL — SIGNIFICANT CHANGE UP (ref 3.3–5)
ALP SERPL-CCNC: 75 U/L — SIGNIFICANT CHANGE UP (ref 40–120)
ALT FLD-CCNC: 16 U/L — SIGNIFICANT CHANGE UP (ref 10–45)
ANION GAP SERPL CALC-SCNC: 12 MMOL/L — SIGNIFICANT CHANGE UP (ref 5–17)
AST SERPL-CCNC: 13 U/L — SIGNIFICANT CHANGE UP (ref 10–40)
BASOPHILS # BLD AUTO: 0.05 K/UL — SIGNIFICANT CHANGE UP (ref 0–0.2)
BASOPHILS NFR BLD AUTO: 0.7 % — SIGNIFICANT CHANGE UP (ref 0–2)
BILIRUB SERPL-MCNC: 0.3 MG/DL — SIGNIFICANT CHANGE UP (ref 0.2–1.2)
BUN SERPL-MCNC: 10 MG/DL — SIGNIFICANT CHANGE UP (ref 7–23)
CALCIUM SERPL-MCNC: 9.2 MG/DL — SIGNIFICANT CHANGE UP (ref 8.4–10.5)
CHLORIDE SERPL-SCNC: 104 MMOL/L — SIGNIFICANT CHANGE UP (ref 96–108)
CHOLEST SERPL-MCNC: 142 MG/DL — SIGNIFICANT CHANGE UP
CO2 SERPL-SCNC: 23 MMOL/L — SIGNIFICANT CHANGE UP (ref 22–31)
CREAT SERPL-MCNC: 0.84 MG/DL — SIGNIFICANT CHANGE UP (ref 0.5–1.3)
EGFR: 97 ML/MIN/1.73M2 — SIGNIFICANT CHANGE UP
EOSINOPHIL # BLD AUTO: 0.1 K/UL — SIGNIFICANT CHANGE UP (ref 0–0.5)
EOSINOPHIL NFR BLD AUTO: 1.5 % — SIGNIFICANT CHANGE UP (ref 0–6)
ESTIMATED AVERAGE GLUCOSE: 163 MG/DL — HIGH (ref 68–114)
GLUCOSE SERPL-MCNC: 108 MG/DL — HIGH (ref 70–99)
HCT VFR BLD CALC: 39.3 % — SIGNIFICANT CHANGE UP (ref 39–50)
HDLC SERPL-MCNC: 56 MG/DL — SIGNIFICANT CHANGE UP
HGB BLD-MCNC: 12.7 G/DL — LOW (ref 13–17)
IMM GRANULOCYTES NFR BLD AUTO: 0.4 % — SIGNIFICANT CHANGE UP (ref 0–1.5)
LIPID PNL WITH DIRECT LDL SERPL: 71 MG/DL — SIGNIFICANT CHANGE UP
LYMPHOCYTES # BLD AUTO: 2.9 K/UL — SIGNIFICANT CHANGE UP (ref 1–3.3)
LYMPHOCYTES # BLD AUTO: 42.8 % — SIGNIFICANT CHANGE UP (ref 13–44)
MAGNESIUM SERPL-MCNC: 1.8 MG/DL — SIGNIFICANT CHANGE UP (ref 1.6–2.6)
MCHC RBC-ENTMCNC: 31.1 PG — SIGNIFICANT CHANGE UP (ref 27–34)
MCHC RBC-ENTMCNC: 32.3 GM/DL — SIGNIFICANT CHANGE UP (ref 32–36)
MCV RBC AUTO: 96.3 FL — SIGNIFICANT CHANGE UP (ref 80–100)
MONOCYTES # BLD AUTO: 0.7 K/UL — SIGNIFICANT CHANGE UP (ref 0–0.9)
MONOCYTES NFR BLD AUTO: 10.3 % — SIGNIFICANT CHANGE UP (ref 2–14)
NEUTROPHILS # BLD AUTO: 3 K/UL — SIGNIFICANT CHANGE UP (ref 1.8–7.4)
NEUTROPHILS NFR BLD AUTO: 44.3 % — SIGNIFICANT CHANGE UP (ref 43–77)
NON HDL CHOLESTEROL: 86 MG/DL — SIGNIFICANT CHANGE UP
NRBC # BLD: 0 /100 WBCS — SIGNIFICANT CHANGE UP (ref 0–0)
PLATELET # BLD AUTO: 283 K/UL — SIGNIFICANT CHANGE UP (ref 150–400)
POTASSIUM SERPL-MCNC: 4.1 MMOL/L — SIGNIFICANT CHANGE UP (ref 3.5–5.3)
POTASSIUM SERPL-SCNC: 4.1 MMOL/L — SIGNIFICANT CHANGE UP (ref 3.5–5.3)
PROT SERPL-MCNC: 6.7 G/DL — SIGNIFICANT CHANGE UP (ref 6–8.3)
RBC # BLD: 4.08 M/UL — LOW (ref 4.2–5.8)
RBC # FLD: 13.9 % — SIGNIFICANT CHANGE UP (ref 10.3–14.5)
SARS-COV-2 RNA SPEC QL NAA+PROBE: SIGNIFICANT CHANGE UP
SODIUM SERPL-SCNC: 139 MMOL/L — SIGNIFICANT CHANGE UP (ref 135–145)
TRIGL SERPL-MCNC: 78 MG/DL — SIGNIFICANT CHANGE UP
TROPONIN T, HIGH SENSITIVITY RESULT: 11 NG/L — SIGNIFICANT CHANGE UP (ref 0–51)
TROPONIN T, HIGH SENSITIVITY RESULT: 12 NG/L — SIGNIFICANT CHANGE UP (ref 0–51)
WBC # BLD: 6.78 K/UL — SIGNIFICANT CHANGE UP (ref 3.8–10.5)
WBC # FLD AUTO: 6.78 K/UL — SIGNIFICANT CHANGE UP (ref 3.8–10.5)

## 2022-05-20 PROCEDURE — 93306 TTE W/DOPPLER COMPLETE: CPT | Mod: 26

## 2022-05-20 PROCEDURE — 71046 X-RAY EXAM CHEST 2 VIEWS: CPT | Mod: 26

## 2022-05-20 PROCEDURE — 85025 COMPLETE CBC W/AUTO DIFF WBC: CPT

## 2022-05-20 PROCEDURE — 71046 X-RAY EXAM CHEST 2 VIEWS: CPT

## 2022-05-20 PROCEDURE — 83036 HEMOGLOBIN GLYCOSYLATED A1C: CPT

## 2022-05-20 PROCEDURE — 87635 SARS-COV-2 COVID-19 AMP PRB: CPT

## 2022-05-20 PROCEDURE — 83735 ASSAY OF MAGNESIUM: CPT

## 2022-05-20 PROCEDURE — 80061 LIPID PANEL: CPT

## 2022-05-20 PROCEDURE — G0378: CPT

## 2022-05-20 PROCEDURE — 82962 GLUCOSE BLOOD TEST: CPT

## 2022-05-20 PROCEDURE — 80053 COMPREHEN METABOLIC PANEL: CPT

## 2022-05-20 PROCEDURE — 93306 TTE W/DOPPLER COMPLETE: CPT

## 2022-05-20 PROCEDURE — 84484 ASSAY OF TROPONIN QUANT: CPT

## 2022-05-20 PROCEDURE — 36415 COLL VENOUS BLD VENIPUNCTURE: CPT

## 2022-05-20 PROCEDURE — 99285 EMERGENCY DEPT VISIT HI MDM: CPT | Mod: 25

## 2022-05-20 RX ORDER — AMLODIPINE BESYLATE 2.5 MG/1
5 TABLET ORAL DAILY
Refills: 0 | Status: DISCONTINUED | OUTPATIENT
Start: 2022-05-20 | End: 2022-05-23

## 2022-05-20 RX ORDER — SODIUM CHLORIDE 9 MG/ML
1000 INJECTION, SOLUTION INTRAVENOUS
Refills: 0 | Status: DISCONTINUED | OUTPATIENT
Start: 2022-05-20 | End: 2022-05-23

## 2022-05-20 RX ORDER — INSULIN LISPRO 100/ML
VIAL (ML) SUBCUTANEOUS
Refills: 0 | Status: DISCONTINUED | OUTPATIENT
Start: 2022-05-20 | End: 2022-05-23

## 2022-05-20 RX ORDER — DEXTROSE 50 % IN WATER 50 %
12.5 SYRINGE (ML) INTRAVENOUS ONCE
Refills: 0 | Status: DISCONTINUED | OUTPATIENT
Start: 2022-05-20 | End: 2022-05-23

## 2022-05-20 RX ORDER — DEXTROSE 50 % IN WATER 50 %
25 SYRINGE (ML) INTRAVENOUS ONCE
Refills: 0 | Status: DISCONTINUED | OUTPATIENT
Start: 2022-05-20 | End: 2022-05-23

## 2022-05-20 RX ORDER — GLUCAGON INJECTION, SOLUTION 0.5 MG/.1ML
1 INJECTION, SOLUTION SUBCUTANEOUS ONCE
Refills: 0 | Status: DISCONTINUED | OUTPATIENT
Start: 2022-05-20 | End: 2022-05-23

## 2022-05-20 RX ORDER — LISINOPRIL 2.5 MG/1
5 TABLET ORAL DAILY
Refills: 0 | Status: DISCONTINUED | OUTPATIENT
Start: 2022-05-20 | End: 2022-05-23

## 2022-05-20 RX ORDER — DEXTROSE 50 % IN WATER 50 %
15 SYRINGE (ML) INTRAVENOUS ONCE
Refills: 0 | Status: DISCONTINUED | OUTPATIENT
Start: 2022-05-20 | End: 2022-05-23

## 2022-05-20 RX ORDER — ATORVASTATIN CALCIUM 80 MG/1
80 TABLET, FILM COATED ORAL AT BEDTIME
Refills: 0 | Status: DISCONTINUED | OUTPATIENT
Start: 2022-05-20 | End: 2022-05-23

## 2022-05-20 RX ORDER — OXYBUTYNIN CHLORIDE 5 MG
10 TABLET ORAL
Refills: 0 | Status: DISCONTINUED | OUTPATIENT
Start: 2022-05-20 | End: 2022-05-23

## 2022-05-20 RX ORDER — TICAGRELOR 90 MG/1
90 TABLET ORAL EVERY 12 HOURS
Refills: 0 | Status: DISCONTINUED | OUTPATIENT
Start: 2022-05-20 | End: 2022-05-23

## 2022-05-20 RX ORDER — INSULIN GLARGINE 100 [IU]/ML
25 INJECTION, SOLUTION SUBCUTANEOUS AT BEDTIME
Refills: 0 | Status: DISCONTINUED | OUTPATIENT
Start: 2022-05-20 | End: 2022-05-23

## 2022-05-20 RX ADMIN — LISINOPRIL 5 MILLIGRAM(S): 2.5 TABLET ORAL at 06:16

## 2022-05-20 RX ADMIN — AMLODIPINE BESYLATE 5 MILLIGRAM(S): 2.5 TABLET ORAL at 06:16

## 2022-05-20 RX ADMIN — Medication 10 MILLIGRAM(S): at 06:16

## 2022-05-20 RX ADMIN — ATORVASTATIN CALCIUM 80 MILLIGRAM(S): 80 TABLET, FILM COATED ORAL at 06:16

## 2022-05-20 RX ADMIN — TICAGRELOR 90 MILLIGRAM(S): 90 TABLET ORAL at 06:16

## 2022-05-20 NOTE — ED PROVIDER NOTE - OBJECTIVE STATEMENT
67 HTN, DM, prostate ca, SHERRY (qhs CPAP) s/p DESx3, s/p PCI/stent to RCA and staged PCI/stent of LAD 01/22 p/w chest pain x hrs ago  pt had 1 hr of substernal cp, described as heaviness. subsided without any events. occurred while sitting. now not endorsing any pain  pt denies any fever, chills, palpitations, sob, abdominal pain, v/d, dysuria, numbness, back pain

## 2022-05-20 NOTE — ED CDU PROVIDER DISPOSITION NOTE - ATTENDING APP SHARED VISIT CONTRIBUTION OF CARE
Dr. Reyna (Attending Physician)  I performed a history and physical exam of the patient and discussed their management with the advanced care provider. I reviewed the advanced care provider's note and agree with the documented findings and plan of care. My medical decision making and objective findings are found above.

## 2022-05-20 NOTE — ED CDU PROVIDER INITIAL DAY NOTE - NS ED ATTENDING STATEMENT MOD
This was a shared visit with the DEVAN. I reviewed and verified the documentation and independently performed the documented:

## 2022-05-20 NOTE — ED PROVIDER NOTE - PROGRESS NOTE DETAILS
Sanam Delacruz PGY-2 patient to be sent to obs for echo, high risk cp, pendign second trop, no active cp

## 2022-05-20 NOTE — ED CDU PROVIDER INITIAL DAY NOTE - ATTENDING APP SHARED VISIT CONTRIBUTION OF CARE
Attending MD Velasquez: I personally have seen and examined this patient.  Resident note reviewed and agree on plan of care and except where noted.  See below for details.     Seen in [place] [facility], accompanied by wife    67M with PMH/PSH including DM, HTN, SHERRY on CPAP qHS, CAD s/p DESx3, s/p PCI/stent to RCA and staged PCI/stent of LAD 01/22 presents to the ED with chest pain.  Reports pain started at around 5pm.  Reports was substernal, nonradiating, reports "felt like a regular pain" unable to provide any more description.  Reports took Brilinta with improvement but it returned.  Denies associated shortness of breath.  Denies LE edema.  Denies abdominal pain, nausea, vomiting, diarrhea, bloody or black stools. Denies dysuria, hematuria, change in urinary habits.  Reports two weeks ago "had a cold" and was "coughing a lot".  Reports those symptoms resolved.  Reports felt similar to when he needed his stents.      Exam:   General: NAD  HENT: head NCAT, airway patent  Eyes: anicteric, no conjunctival injection   Lungs: lungs CTAB with good inspiratory effort, no wheezing, no rhonchi, no rales  Cardiac: +S1S2, no obvious r/g, +murmur  GI: abdomen soft with +BS, NT, ND  : no CVAT  MSK: FROM at neck, no tenderness to midline palpation, no stepoffs along length of spine, no calf tenderness, swelling, erythema or warmth  Neuro: moving all extremities spontaneously, sensory grossly intact, no gross neuro deficits  Psych: normal mood and affect     A/P: 67M with chest pain, will obtain labs, EKG, CXR, trop given previous CAD, concern for ACS, will likely place in CDU for echo, reassessments and tele monitoring,  if Emergency Department work up nonacitonable, patient amenable

## 2022-05-20 NOTE — ED CDU PROVIDER INITIAL DAY NOTE - PHYSICAL EXAMINATION
GEN: Pt non-toxic in NAD, A&Ox3.  PSYCH: Affect and mood appropriate.  EYES: Sclera white w/o injection.  ENT: Neck supple FROM. Airway patent.  RESP: CTA b/l, no wheezes, rales, or rhonchi.   CARDIAC: RRR, clear distinct S1, S2, no appreciable murmurs.  ABD: Abdomen soft, non-tender.  MSK: Moving all extremities.  NEURO: No focal motor or sensory deficits.  VASC: Radial pulses 2+ b/l. No edema or calf tenderness.  SKIN: No rashes or lesions.

## 2022-05-20 NOTE — ED PROVIDER NOTE - NSICDXPASTMEDICALHX_GEN_ALL_CORE_FT
EGD with biopsy    History of celiac disease epigastric pain evaluate for disease activity    EBL 0    Dr. Elisa Singh    Previous exam 2012 Dr. Gray no villous atrophy    Sedation which I supervised Demerol 100 mg Versed 10 mg intravenously    Consent was obtained for EGD and colonoscopy including the risk of perforation bleeding drug reaction infection she agrees to proceed ASA class to airway class II cardiopulmonary monitoring was done for during and after both procedures with pulse oximetry 2-lead ECG and blood pressure no abnormalities were noted on 2 L of oxygen    Start time 943 endoscope and 955 out 959 colonoscope in 1011 cecum 1030 scope out 103 7  Sedation was given a video Olympus endoscope was introduced the hypopharynx which was unremarkable esophagus directly intubated. Proximal mid and distal esophagus showed no erythema ulceration or exudate other than some mild nonerosive erythema at the Z line at 40 cm which was the entrance into the stomach at the GE junction. Stomach was suctioned free of clear fluid easily insufflated gastric will folds are normal size shape and color. Minimal erythema the distal body and antrum without deformity or ulceration the pylorus was symmetrical And descending duodenum abnormal fold pattern no erythema or ulceration. Descending duodenal biopsies were done antral biopsies were done distal esophageal biopsies were done retroflexion in the stomach normal angularis fundus and cardia no hiatal hernia. Scope was removed patient tolerated procedure well without apparent complication    Impression recommendation  No endoscopic signs of sprue check biopsies check TTG to correlate with biopsy findings for future reference. Mild nonerosive gastritis esophagitis no evidence of stricture or ring further recommendations pending path review      Colonoscopy    Family history colorectal carcinoma last exam no polyps 2012 this is done under EGD sedation previous consent obtained an  extended cardiopulmonary monitoring without abnormality    Digital exam was unremarkable a video Olympus pediatric colonoscope was introduced in the rectum advanced to the ileum. The colon was redundant there was a good prep scope was carefully removed demonstrating no erythema ulceration polyp mass or exudate in cecum ascending transverse descending or sigmoid colon normal fold and vascular pattern rare diverticulum left colon retroflexion small nonbleeding internal hemorrhoids scope was removed patient tolerate procedure well without apparent complication    Impression recommendation no evidence polyp or mass repeat exam in 5 years due to family history of colon cancer   PAST MEDICAL HISTORY:  Diabetes     HTN (hypertension)     Hypercholesterolemia     Prostate cancer     Sleep apnea uses CPAP machine at night

## 2022-05-20 NOTE — ED CDU PROVIDER DISPOSITION NOTE - CLINICAL COURSE
67 HTN, DM, prostate ca, SHERRY (qhs CPAP) s/p DESx3, s/p PCI/stent to RCA and staged PCI/stent of LAD 01/22 p/w chest pain x hrs ago. pt had 1 hr of substernal cp, described as heaviness. subsided without any events. occurred while sitting. now not endorsing any pain. pt denies any fever, chills, palpitations, sob, abdominal pain, v/d, dysuria, numbness, back pain.  In ED, trops 12->11, no ischemic ekg changes, cxr clear, no active cp. Seen by cardiology. CDU for tele monitoring and echo. Cards following.  In CDU, 67 HTN, DM, prostate ca, SHERRY (qhs CPAP) s/p DESx3, s/p PCI/stent to RCA and staged PCI/stent of LAD 01/22 p/w chest pain x hrs ago. pt had 1 hr of substernal cp, described as heaviness. subsided without any events. occurred while sitting. now not endorsing any pain. pt denies any fever, chills, palpitations, sob, abdominal pain, v/d, dysuria, numbness, back pain.  In ED, trops 12->11, no ischemic ekg changes, cxr clear, no active cp. Seen by cardiology. CDU for tele monitoring and echo. Cards following.  In CDU, pt with no events on telemetry and remained without chest pain.   TTE results with no change from prior. Results reviewed with cardiology attending, Dr. Murrell, no further testing necessary, pt can f/u with his cardiologist in office. Stable for d/c. All results d/w patient and copy to be provided at discharge.

## 2022-05-20 NOTE — CONSULT NOTE ADULT - SUBJECTIVE AND OBJECTIVE BOX
Patient seen and evaluated at bedside    Chief Complaint:    HPI:  68 yo man PMHx of CAD s/p prior PCI recently in 1/2022, HTN, and DM who presents w/ chest pain. Patient said chest pain started around in the afternoon, midsternal, similar to his STEMI back in January. Did not radiate anywhere. Lasted 1-2 hours; went away after taking brilinta. He noted that the chest pain came on again later that night so he decided to come into the ED. On arrival to hospital, chest pain resolved on its own. Denied any NV, diaphoresis, SOB, palpitations, lightheadedness, dizziness. Denies any smoking, drug use. Drinks daily.     In the ED, VSS.     EKG showed sinus rhythm w/ notable Q waves in III and aVF     PMHx:   HTN (hypertension)    Diabetes    Hypercholesterolemia    Sleep apnea    Prostate cancer        PSHx:   No significant past surgical history    History of pterygium excision        Allergies:  No Known Allergies      Home Meds:    Current Medications:       FAMILY HISTORY:      Social History:  Smoking History:  Alcohol Use:  Drug Use:    REVIEW OF SYSTEMS:  Constitutional:     [x ] negative [ ] fevers [ ] chills [ ] weight loss [ ] weight gain  HEENT:                  [x ] negative [ ] dry eyes [ ] eye irritation [ ] postnasal drip [ ] nasal congestion  CV:                         [ x] negative  [ ] chest pain [ ] orthopnea [ ] palpitations [ ] murmur  Resp:                     [x ] negative [ ] cough [ ] shortness of breath [ ] dyspnea [ ] wheezing [ ] sputum [ ]hemoptysis  GI:                          [ x] negative [ ] nausea [ ] vomiting [ ] diarrhea [ ] constipation [ ] abd pain [ ] dysphagia   :                        [ x] negative [ ] dysuria [ ] nocturia [ ] hematuria [ ] increased urinary frequency  Musculoskeletal: [x ] negative [ ] back pain [ ] myalgias [ ] arthralgias [ ] fracture  Skin:                       [ x] negative [ ] rash [ ] itch  Neurological:        [ x] negative [ ] headache [ ] dizziness [ ] syncope [ ] weakness [ ] numbness  Psychiatric:           [ x] negative [ ] anxiety [ ] depression  Endocrine:            [ x] negative [ ] diabetes [ ] thyroid problem  Heme/Lymph:      [ x] negative [ ] anemia [ ] bleeding problem  Allergic/Immune: [ x] negative [ ] itchy eyes [ ] nasal discharge [ ] hives [ ] angioedema    [ x] All other systems negative  [ ] Unable to assess ROS due to      Physical Exam:  T(F): 98.1 (05-19), Max: 98.1 (05-19)  HR: 69 (05-19) (69 - 69)  BP: 152/94 (05-19) (152/94 - 152/94)  RR: 18 (05-19)  SpO2: 98% (05-19)  GENERAL: No acute distress   CHEST/LUNG: Clear to auscultation bilaterally; No wheeze, equal breath sounds bilaterally   HEART: Regular rate and rhythm; No murmurs, rubs, or gallops  EXTREMITIES:  No clubbing, cyanosis, or edema  PSYCH: Nl behavior, nl affect  NEUROLOGY: AAOx3, non-focal   SKIN: Normal color, No rashes or lesions  LINES:    Cardiovascular Diagnostic Testing:    ECG: Personally reviewed:    Echo: Personally reviewed:    Stress Testing:    Cath:    Imaging:    CXR: Personally reviewed    Labs: Personally reviewed                        12.7   6.78  )-----------( 283      ( 20 May 2022 02:23 )             39.3     05-20    139  |  104  |  10  ----------------------------<  108<H>  4.1   |  23  |  0.84    Ca    9.2      20 May 2022 02:23  Mg     1.8     05-20    TPro  6.7  /  Alb  4.2  /  TBili  0.3  /  DBili  x   /  AST  13  /  ALT  16  /  AlkPhos  75  05-20             Patient seen and evaluated at bedside    Chief Complaint:    HPI:  66 yo man PMHx of CAD s/p prior PCI recently in 1/2022, HTN, and DM who presents w/ chest pain. Patient said chest pain started around in the afternoon, midsternal, similar to his STEMI back in January. Did not radiate anywhere. Lasted 1-2 hours; went away after taking brilinta. He noted that the chest pain came on again later that night so he decided to come into the ED. On arrival to hospital, chest pain resolved on its own. Denied any NV, diaphoresis, SOB, palpitations, lightheadedness, dizziness. Denies any smoking, drug use. Drinks daily.     In the ED, VSS.   EKG showed sinus rhythm w/ notable Q waves in III and aVF     PMHx:   HTN (hypertension)  Diabetes  Hypercholesterolemia  Sleep pnea  Prostate cancer    PSHx:   No significant past surgical history    History of pterygium excision    Allergies:  No Known Allergies  Home Medications:   * Patient Currently Takes Medications as of 03-Jan-2022 15:16 documented in Structured Notes  · 	Prandin 1 mg oral tablet: 1 tab(s) orally 3 times a day (before meals)  check blood sugar prior meal hold if glucose <less then 100 or NPO or missing meal  	  · 	Trulicity Pen 0.75 mg/0.5 mL subcutaneous solution: 0.75 milligram(s) subcutaneously once a week   · 	ketotifen 0.025% ophthalmic solution: 1 drop(s) to each affected eye 2 times a day  · 	ocular lubricant ophthalmic solution: 1 drop(s) to each affected eye 2 times a day  · 	metoprolol succinate 25 mg oral tablet, extended release: 1 tab(s) orally once a day  · 	ticagrelor 90 mg oral tablet: 1 tab(s) orally every 12 hours  · 	aspirin 81 mg oral delayed release tablet: 1 tab(s) orally once a day  · 	metFORMIN 500 mg oral tablet: 2 tab(s) orally once a day  · 	atorvastatin 80 mg oral tablet: 1 tab(s) orally once a day (at bedtime)  · 	lisinopril 5 mg oral tablet: 1 tab(s) orally once a day  · 	test strips (per patient's insurance): 1 application subcutaneously 4 times a day. ** Compatible with patient's glucometer **  · 	Lantus Solostar Pen 100 units/mL subcutaneous solution: 28 unit(s) subcutaneous once a day (at bedtime)   · 	lancets: 1 application subcutaneously 4 times a day   · 	alcohol swabs : Apply topically to affected area 4 times a day   · 	Insulin Pen Needles, 4mm: 1 application subcutaneously 4 times a day. ** Use with insulin pen **   · 	clomiPRAMINE 25 mg oral capsule: orally once a day, As Needed  · 	solifenacin 5 mg oral tablet: 1 tab(s) orally once a day    FAMILY HISTORY:  no premature cardiac death    Social History:  Smoking History:  Alcohol Use:  Drug Use:    REVIEW OF SYSTEMS:  Constitutional:     [x ] negative [ ] fevers [ ] chills [ ] weight loss [ ] weight gain  HEENT:                  [x ] negative [ ] dry eyes [ ] eye irritation [ ] postnasal drip [ ] nasal congestion  CV:                         [ ] negative  [x] chest pain [ ] orthopnea [ ] palpitations [ ] murmur  Resp:                     [x ] negative [ ] cough [ ] shortness of breath [ ] dyspnea [ ] wheezing [ ] sputum [ ]hemoptysis  GI:                          [ x] negative [ ] nausea [ ] vomiting [ ] diarrhea [ ] constipation [ ] abd pain [ ] dysphagia   :                        [ x] negative [ ] dysuria [ ] nocturia [ ] hematuria [ ] increased urinary frequency  Musculoskeletal: [x ] negative [ ] back pain [ ] myalgias [ ] arthralgias [ ] fracture  Skin:                       [ x] negative [ ] rash [ ] itch  Neurological:        [ x] negative [ ] headache [ ] dizziness [ ] syncope [ ] weakness [ ] numbness  Psychiatric:           [ x] negative [ ] anxiety [ ] depression  Endocrine:            [ x] negative [ ] diabetes [ ] thyroid problem  Heme/Lymph:      [ x] negative [ ] anemia [ ] bleeding problem  Allergic/Immune: [ x] negative [ ] itchy eyes [ ] nasal discharge [ ] hives [ ] angioedema    [ x] All other systems negative  [ ] Unable to assess ROS due to    Physical Exam:  T(F): 98.1 (05-19), Max: 98.1 (05-19)  HR: 69 (05-19) (69 - 69)  BP: 152/94 (05-19) (152/94 - 152/94)  RR: 18 (05-19)  SpO2: 98% (05-19)  GENERAL: No acute distress   CHEST/LUNG: Clear to auscultation bilaterally; No wheeze, equal breath sounds bilaterally   HEART: Regular rate and rhythm; No murmurs, rubs, or gallops  EXTREMITIES:  No clubbing, cyanosis, or edema  PSYCH: Nl behavior, nl affect  NEUROLOGY: AAOx3, non-focal   SKIN: Normal color, No rashes or lesions    Cardiovascular Diagnostic Testing:    ECG: Personally reviewed:    Echo: 1/22/2022  Conclusions:  Endocardial visualization enhanced with intravenous  injection of Ultrasonic Enhancing Agent (Definity).  Normal left ventricular systolic function. The basal  inferior wall is akinetic.    Cath: 1/22/2022  Procedures:                 1.    Art Access - R radial artery   2.    Drug Eluting Stent Initial Vessel     PCI Status: [facility]     Conclusions:   Successful PCI to severe stenosis proximal LAD using Synergy KINDRA     Recommendations:   Continue DAPT   Aggressive risk factor modification     Interventional Details   Proximal left anterior descending: The initial stenosis was 80 %.  Guidewire crossing was successful.    A successful Drug Eluting Stent was deployed using a 6FR JL3.5  LAUNCHER, a SNOW [id number], and a 3.50 X 12  SYNERGY XD.      The inflation pressure was 16 kevin for the duration of 9.0 seconds.     A successful Balloon angioplasty was performed using a 4.0 X 8 MM NC  EMERGE .    The inflation pressure was 16 kevin for the duration of 12.0 seconds.     Imaging:    CXR: Personally reviewed    Labs: Personally reviewed                        12.7   6.78  )-----------( 283      ( 20 May 2022 02:23 )             39.3     05-20    139  |  104  |  10  ----------------------------<  108<H>  4.1   |  23  |  0.84    Ca    9.2      20 May 2022 02:23  Mg     1.8     05-20    TPro  6.7  /  Alb  4.2  /  TBili  0.3  /  DBili  x   /  AST  13  /  ALT  16  /  AlkPhos  75  05-20

## 2022-05-20 NOTE — ED CDU PROVIDER DISPOSITION NOTE - PATIENT PORTAL LINK FT
You can access the FollowMyHealth Patient Portal offered by [facility] by registering at the following website: [email or web address]. By joining [employer]’s FollowMyHealth portal, you will also be able to view your health information using other applications (apps) compatible with our system.

## 2022-05-20 NOTE — ED ADULT NURSE NOTE - OBJECTIVE STATEMENT
66 y/o male PMH HTN, DM, prostate cancer s/p PCI/stent to RCA and staged PCI/stent of LAD 01/22 presents to ED from home c/o chest pain that began a few hours ago. He states she had 1 hour of substernal chest pain/heaviness that lasted around an hour - has since resolved. Denying palpitations, n/v/d, SOB, urinary symptoms. Pt is A&O x 4. Breathing even and unlabored. Skin warm, dry. Abdomen is soft, nondistended, nontender to palpation. Gross motor and neuro intact. 20G IV placed in LAC. NSR on CM. Safety and comfort provided.

## 2022-05-20 NOTE — ED CDU PROVIDER INITIAL DAY NOTE - OBJECTIVE STATEMENT
67 HTN, DM, prostate ca, SHERRY (qhs CPAP) s/p DESx3, s/p PCI/stent to RCA and staged PCI/stent of LAD 01/22 p/w chest pain x hrs ago. pt had 1 hr of substernal cp, described as heaviness. subsided without any events. occurred while sitting. now not endorsing any pain. pt denies any fever, chills, palpitations, sob, abdominal pain, v/d, dysuria, numbness, back pain.  In ED, trops 12->11, no ischemic ekg changes, cxr clear, no active cp. Seen by cardiology. CDU for tele monitoring and echo. Cards following.

## 2022-05-20 NOTE — ED PROVIDER NOTE - CLINICAL SUMMARY MEDICAL DECISION MAKING FREE TEXT BOX
ddx unstable angina vs nstemi vs esophagitis pending EKGs/CXR/cardiac monitor/labs  2) reassess  The CXR assists in r/o PNA, Ptx & esophageal tears.  The pt doesn't appear to have a PE based on the Wells Score & there is no apparent DVT.  There are no signs of pericarditis, endocarditis, or myocarditis based on hx, risk factor analysis & the ED EKG.  There is no fever.  There also doesn't appear to be an aortic dissection based on hx, exam, and signs.

## 2022-05-20 NOTE — ED ADULT NURSE NOTE - NSFALLRSKPASTHIST_ED_ALL_ED
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

## 2022-05-20 NOTE — ED CDU PROVIDER INITIAL DAY NOTE - PROGRESS NOTE DETAILS
Patient seen and evaluated at bedside with Dr. Reyna, sitting eating breakfast, NAD. No complaints. Most recent VSS. No events on telemetry monitor. TTE performed and pending. TTE results with no change from prior. Results reviewed with cardiology attending, Dr. Murrell, no further testing necessary, pt can f/u with his cardiologist in office. Pt remains without chest pain. Stable for d/c, d/w Dr. Reyna. All results d/w patient and copy to be provided at discharge. TTE results with no change from prior. Results reviewed with cardiology attending, Dr. Murrell, no further testing necessary, pt can f/u with his cardiologist in office. Pt confirms he has a cardiologist to f/u with. Pt remains without chest pain. Stable for d/c, d/w Dr. Reyna. All results d/w patient and copy to be provided at discharge.

## 2022-05-20 NOTE — CONSULT NOTE ADULT - ATTENDING COMMENTS
67 year old man with past medical history of CAD s/p pLAD PCI 1/2022, HTN, and DM who presents with non-radiating, substernal chest discomfort that resolved spontaneously. Cardiac biomarkers completely normal x2, ECG with inferior wall infarct age indeterminate but non-ischemic. Repeat ECHO with preserved EF and inferior and inferolateral wall hypokinesis similar to prior ECHOs. Continue home medications as prescribed. Address other possible non-cardiac etiology. Follow up outpatient routine. Acceptable for discharge.     Handy Murrell MD, MPH, LEAH, RPVI, [facility]  Inpatient Cardiovascular Specialist; [facility], [facility] ([facility]  ; [employer] at [facility]  message: [employer] [phone] or Microsoft Teams (text preferred and/or call)  email: [email or web address] on-service contact/call information, go to [email or web address] and use "[email or web address]" to login.  Outpatient Cardiology appointments, call  [phone] to arrange with a colleague; I do not have outpatient Cardiology clinic.

## 2022-05-20 NOTE — ED ADULT NURSE REASSESSMENT NOTE - NS ED NURSE REASSESS COMMENT FT1
Received pt from [facility] [place] , received pt alert and responsive, oriented x4, denies any respiratory distress, SOB, or difficulty breathing. Pt transferred to CDU for c/o chest pain. Pt is currently asymptomatic, pain free. Pt denies chest pin, pressure, tightness, SOB, diaphoresis, dizziness, lightheadedness, N/V, heart palpitations. On telemetry pt is SR on monitor. Pending TTE. IV in place, patent and free of signs of infiltration, V/S stable, pt afebrile. Pt educated on unit and unit rules, instructed patient to notify RN of any needed assistance, Pt verbalizes understanding, Call bell placed within reach. Safety maintained. Will continue to monitor.

## 2022-05-20 NOTE — ED PROVIDER NOTE - ATTENDING CONTRIBUTION TO CARE
Attending MD Velasquez: I personally have seen and examined this patient.  Resident note reviewed and agree on plan of care and except where noted.  See below for details.     Seen in [place], accompanied by wife    67M with PMH/PSH including DM, HTN, SHERRY on CPAP qHS, CAD s/p DESx3, s/p PCI/stent to RCA and staged PCI/stent of LAD 01/22 presents to the ED with chest pain.  Reports pain started at around 5pm.  Reports was substernal, nonradiating, reports "felt like a regular pain" unable to provide any more description.  Reports took Brilinta with improvement but it returned.  Denies associated shortness of breath.  Denies LE edema.  Denies abdominal pain, nausea, vomiting, diarrhea, bloody or black stools. Denies dysuria, hematuria, change in urinary habits.  Reports two weeks ago "had a cold" and was "coughing a lot".  Reports those symptoms resolved.  Reports felt similar to when he needed his stents.      Exam:   General: NAD  HENT: head NCAT, airway patent  Eyes: PERRL  Lungs: lungs CTAB with good inspiratory effort, no wheezing, no rhonchi, no rales  Cardiac: +S1S2, no obvious r/g, +murmur  GI: abdomen soft with +BS, NT, ND  : no CVAT  MSK: FROM at neck, no tenderness to midline palpation, no stepoffs along length of spine, no calf tenderness, swelling, erythema or warmth  Neuro: moving all extremities spontaneously, sensory grossly intact, no gross neuro deficits  Psych: normal mood and affect     A/P: 67M with chest pain,     TO BE COMPLETED Attending MD Velasquez: I personally have seen and examined this patient.  Resident note reviewed and agree on plan of care and except where noted.  See below for details.     Seen in [place], accompanied by wife    67M with PMH/PSH including DM, HTN, SHERRY on CPAP qHS, CAD s/p DESx3, s/p PCI/stent to RCA and staged PCI/stent of LAD 01/22 presents to the ED with chest pain.  Reports pain started at around 5pm.  Reports was substernal, nonradiating, reports "felt like a regular pain" unable to provide any more description.  Reports took Brilinta with improvement but it returned.  Denies associated shortness of breath.  Denies LE edema.  Denies abdominal pain, nausea, vomiting, diarrhea, bloody or black stools. Denies dysuria, hematuria, change in urinary habits.  Reports two weeks ago "had a cold" and was "coughing a lot".  Reports those symptoms resolved.  Reports felt similar to when he needed his stents.      Exam:   General: NAD  HENT: head NCAT, airway patent  Eyes: anicteric, no conjunctival injection   Lungs: lungs CTAB with good inspiratory effort, no wheezing, no rhonchi, no rales  Cardiac: +S1S2, no obvious r/g, +murmur  GI: abdomen soft with +BS, NT, ND  : no CVAT  MSK: FROM at neck, no tenderness to midline palpation, no stepoffs along length of spine, no calf tenderness, swelling, erythema or warmth  Neuro: moving all extremities spontaneously, sensory grossly intact, no gross neuro deficits  Psych: normal mood and affect     A/P: 67M with chest pain, will obtain labs, EKG, CXR, trop given previous CAD, concern for ACS, will likely place in CDU for echo, reassessments and tele monitoring,  if Emergency Department work up nonacitonable, patient amenable

## 2022-05-20 NOTE — ED CDU PROVIDER DISPOSITION NOTE - NSFOLLOWUPINSTRUCTIONS_ED_ALL_ED_FT
1) Follow-up with your primary care provider in 1-2 days.      Follow-up with cardiology within 1-2 weeks.    Kim Huerta)  Cardiology; Internal Medicine  [phone] [place] [address], [address]  [place], [place] [id number]  Phone: [phone]  Fax: [phone]    2) Continue to take all medications as prescribed.    3) Rest and drink plenty of fluids. Pain can be managed with Acetaminophen (aka Tylenol) and Ibuprofen (aka Motrin or Advil) over the counter as directed. Take with food.    4) Return to the ER for any new or worsening symptoms. 1) Follow-up with your primary care provider in 1-2 days.      Follow-up with cardiology within 1-2 weeks.    *** Be sure to follow up Elevated A1c level with your doctor.     2) Continue to take all medications as prescribed.    3) Rest and drink plenty of fluids. Pain can be managed with Acetaminophen (aka Tylenol) over the counter as directed.    4) Return to the ER for any new or worsening symptoms including new chest pain, trouble breathing, palpitations, dizziness, passing out, or any other concerns.

## 2022-05-20 NOTE — CONSULT NOTE ADULT - ASSESSMENT
66 yo man PMHx of CAD s/p prior PCI recently in 1/2022, HTN, and DM who presents w/ chest pain.    #Chest pain  EKG unrevealing, Trop neg x1. CXRAY unrevealing. Low suspicion for ACS, dissection, PE.   -F/u 2nd troponin   -Tele   -Recommend echo     #CAD   -Continue aspirin 81mg   -Continue brilinta 90mg BID   -Continue atorvastatin   -Continue metoprolol, can increase to 50mg once daily

## 2022-08-22 NOTE — ED CDU PROVIDER DISPOSITION NOTE - WR ORDER NAME 1
[FreeTextEntry1] : 11/8/21: Advanced R hip OA head collapse. Scoliosis may be attributing to antalgic ambulation. Hx of 2 stents- taking aspirin. Discussed tx options- anti-inflammatories, PT. Advised CSI may not be effective due to extent of OA. Briefly discussed R MARÍA due to severity of disease, but patient is opposed to this. Begin with PT and R hip CSI.\par 12/13/21: Mild improvement from inj. Still with pain daily but not interested in MARÍA. She would like to cont with PT/HEP, will discuss NSAID use with her cardiologist. Offered repeat inj in a few months but explained it will unlikely give complete pain relief and she ultimately will need MARÍA but she does not want surgery.\par \par 8/22/22: Currently doing HEP and is not interested in MARÍA at this point. She is not a surgical candidate at this time, as she will not get better w/o surgical treatment. She is using a cane to ambulate. Will write her a permanent handicap pass. Prescribed Mobic.  Xray Chest 2 Views PA/Lat

## 2023-01-09 ENCOUNTER — APPOINTMENT (OUTPATIENT)
Dept: GASTROENTEROLOGY | Facility: CLINIC | Age: 68
End: 2023-01-09
Payer: COMMERCIAL

## 2023-01-09 VITALS
WEIGHT: 243 LBS | HEIGHT: 69 IN | OXYGEN SATURATION: 98 % | HEART RATE: 82 BPM | SYSTOLIC BLOOD PRESSURE: 130 MMHG | TEMPERATURE: 98 F | BODY MASS INDEX: 35.99 KG/M2 | DIASTOLIC BLOOD PRESSURE: 80 MMHG

## 2023-01-09 DIAGNOSIS — Z86.79 PERSONAL HISTORY OF OTHER DISEASES OF THE CIRCULATORY SYSTEM: ICD-10-CM

## 2023-01-09 DIAGNOSIS — I51.9 HEART DISEASE, UNSPECIFIED: ICD-10-CM

## 2023-01-09 DIAGNOSIS — I10 ESSENTIAL (PRIMARY) HYPERTENSION: ICD-10-CM

## 2023-01-09 DIAGNOSIS — Z83.3 FAMILY HISTORY OF DIABETES MELLITUS: ICD-10-CM

## 2023-01-09 DIAGNOSIS — Z86.39 PERSONAL HISTORY OF OTHER ENDOCRINE, NUTRITIONAL AND METABOLIC DISEASE: ICD-10-CM

## 2023-01-09 DIAGNOSIS — H26.9 UNSPECIFIED CATARACT: ICD-10-CM

## 2023-01-09 DIAGNOSIS — R39.89 OTHER SYMPTOMS AND SIGNS INVOLVING THE GENITOURINARY SYSTEM: ICD-10-CM

## 2023-01-09 DIAGNOSIS — Z95.5 PRESENCE OF CORONARY ANGIOPLASTY IMPLANT AND GRAFT: ICD-10-CM

## 2023-01-09 DIAGNOSIS — E11.9 TYPE 2 DIABETES MELLITUS W/OUT COMPLICATIONS: ICD-10-CM

## 2023-01-09 DIAGNOSIS — Z86.010 PERSONAL HISTORY OF COLONIC POLYPS: ICD-10-CM

## 2023-01-09 DIAGNOSIS — Z78.9 OTHER SPECIFIED HEALTH STATUS: ICD-10-CM

## 2023-01-09 PROCEDURE — 99204 OFFICE O/P NEW MOD 45 MIN: CPT

## 2023-01-09 RX ORDER — SODIUM PICOSULFATE, MAGNESIUM OXIDE, AND ANHYDROUS CITRIC ACID 10; 3.5; 12 MG/160ML; G/160ML; G/160ML
10-3.5-12 MG-GM LIQUID ORAL
Qty: 1 | Refills: 0 | Status: COMPLETED | COMMUNITY
Start: 2023-01-09 | End: 2023-01-11

## 2023-01-09 NOTE — ASSESSMENT
[FreeTextEntry1] : Impression: History of colon polyps due for follow-up surveillance colonoscopy.  Coronary stents placed a year ago on Brilinta and aspirin.\par \par Plan: We will schedule colonoscopy with Clenpiq prep.  Needs cardiac clearance.  Hold Brilinta 5 days if agreed upon by cardiology, continue ASA 81 mg through procedure

## 2023-01-09 NOTE — HISTORY OF PRESENT ILLNESS
[FreeTextEntry1] : 67-year-old male with history of colon polyps due for 5-year follow-up surveillance colonoscopy.  History of colon polyps on last exam.  History of type 2 diabetes well controlled.  In January 2022 had coronary catheterization with stents placed.  Taking Brilinta and ASA 81 mg.  Recently had a stress test.  Results pending.  Has a follow-up visit with cardiologist pending.

## 2023-01-10 DIAGNOSIS — Z01.812 ENCOUNTER FOR PREPROCEDURAL LABORATORY EXAMINATION: ICD-10-CM

## 2023-01-10 DIAGNOSIS — Z20.822 ENCOUNTER FOR PREPROCEDURAL LABORATORY EXAMINATION: ICD-10-CM

## 2023-01-23 RX ORDER — SODIUM SULFATE, MAGNESIUM SULFATE, AND POTASSIUM CHLORIDE 17.75; 2.7; 2.25 G/1; G/1; G/1
1479-225-188 TABLET ORAL
Qty: 1 | Refills: 0 | Status: ACTIVE | COMMUNITY
Start: 2023-01-23 | End: 1900-01-01

## 2023-02-22 ENCOUNTER — APPOINTMENT (OUTPATIENT)
Dept: GASTROENTEROLOGY | Facility: AMBULATORY MEDICAL SERVICES | Age: 68
End: 2023-02-22
Payer: COMMERCIAL

## 2023-02-22 PROCEDURE — 45385 COLONOSCOPY W/LESION REMOVAL: CPT | Mod: 33

## 2024-03-18 ENCOUNTER — APPOINTMENT (OUTPATIENT)
Dept: OPHTHALMOLOGY | Facility: CLINIC | Age: 69
End: 2024-03-18
Payer: COMMERCIAL

## 2024-03-18 ENCOUNTER — NON-APPOINTMENT (OUTPATIENT)
Age: 69
End: 2024-03-18

## 2024-03-18 PROCEDURE — 92014 COMPRE OPH EXAM EST PT 1/>: CPT

## 2024-04-14 ENCOUNTER — EMERGENCY (EMERGENCY)
Facility: HOSPITAL | Age: 69
LOS: 0 days | Discharge: HOME | End: 2024-04-14
Attending: EMERGENCY MEDICINE
Payer: COMMERCIAL

## 2024-04-14 VITALS
HEART RATE: 72 BPM | SYSTOLIC BLOOD PRESSURE: 136 MMHG | OXYGEN SATURATION: 97 % | DIASTOLIC BLOOD PRESSURE: 87 MMHG | TEMPERATURE: 98 F | RESPIRATION RATE: 18 BRPM

## 2024-04-14 VITALS
SYSTOLIC BLOOD PRESSURE: 144 MMHG | HEIGHT: 69 IN | TEMPERATURE: 98 F | HEART RATE: 77 BPM | WEIGHT: 244.05 LBS | DIASTOLIC BLOOD PRESSURE: 86 MMHG | RESPIRATION RATE: 20 BRPM | OXYGEN SATURATION: 96 %

## 2024-04-14 DIAGNOSIS — I10 ESSENTIAL (PRIMARY) HYPERTENSION: ICD-10-CM

## 2024-04-14 DIAGNOSIS — E11.9 TYPE 2 DIABETES MELLITUS WITHOUT COMPLICATIONS: ICD-10-CM

## 2024-04-14 DIAGNOSIS — M79.651 PAIN IN RIGHT THIGH: ICD-10-CM

## 2024-04-14 DIAGNOSIS — Z87.891 PERSONAL HISTORY OF NICOTINE DEPENDENCE: ICD-10-CM

## 2024-04-14 DIAGNOSIS — M79.652 PAIN IN LEFT THIGH: ICD-10-CM

## 2024-04-14 DIAGNOSIS — E78.5 HYPERLIPIDEMIA, UNSPECIFIED: ICD-10-CM

## 2024-04-14 DIAGNOSIS — Z98.890 OTHER SPECIFIED POSTPROCEDURAL STATES: Chronic | ICD-10-CM

## 2024-04-14 DIAGNOSIS — G47.33 OBSTRUCTIVE SLEEP APNEA (ADULT) (PEDIATRIC): ICD-10-CM

## 2024-04-14 LAB
ALBUMIN SERPL ELPH-MCNC: 3.3 G/DL — SIGNIFICANT CHANGE UP (ref 3.3–5)
ALP SERPL-CCNC: 90 U/L — SIGNIFICANT CHANGE UP (ref 40–120)
ALT FLD-CCNC: 27 U/L — SIGNIFICANT CHANGE UP (ref 12–78)
ANION GAP SERPL CALC-SCNC: 11 MMOL/L — SIGNIFICANT CHANGE UP (ref 5–17)
AST SERPL-CCNC: 17 U/L — SIGNIFICANT CHANGE UP (ref 15–37)
BASOPHILS # BLD AUTO: 0.04 K/UL — SIGNIFICANT CHANGE UP (ref 0–0.2)
BASOPHILS NFR BLD AUTO: 0.8 % — SIGNIFICANT CHANGE UP (ref 0–2)
BILIRUB SERPL-MCNC: 0.6 MG/DL — SIGNIFICANT CHANGE UP (ref 0.2–1.2)
BUN SERPL-MCNC: 10 MG/DL — SIGNIFICANT CHANGE UP (ref 7–23)
CALCIUM SERPL-MCNC: 8.9 MG/DL — SIGNIFICANT CHANGE UP (ref 8.5–10.1)
CHLORIDE SERPL-SCNC: 104 MMOL/L — SIGNIFICANT CHANGE UP (ref 96–108)
CK SERPL-CCNC: 196 U/L — SIGNIFICANT CHANGE UP (ref 26–308)
CO2 SERPL-SCNC: 24 MMOL/L — SIGNIFICANT CHANGE UP (ref 22–31)
CREAT SERPL-MCNC: 0.78 MG/DL — SIGNIFICANT CHANGE UP (ref 0.5–1.3)
EGFR: 98 ML/MIN/1.73M2 — SIGNIFICANT CHANGE UP
EOSINOPHIL # BLD AUTO: 0.03 K/UL — SIGNIFICANT CHANGE UP (ref 0–0.5)
EOSINOPHIL NFR BLD AUTO: 0.6 % — SIGNIFICANT CHANGE UP (ref 0–6)
GLUCOSE SERPL-MCNC: 261 MG/DL — HIGH (ref 70–99)
HCT VFR BLD CALC: 42.9 % — SIGNIFICANT CHANGE UP (ref 39–50)
HGB BLD-MCNC: 13.9 G/DL — SIGNIFICANT CHANGE UP (ref 13–17)
IMM GRANULOCYTES NFR BLD AUTO: 0.4 % — SIGNIFICANT CHANGE UP (ref 0–0.9)
LYMPHOCYTES # BLD AUTO: 1.71 K/UL — SIGNIFICANT CHANGE UP (ref 1–3.3)
LYMPHOCYTES # BLD AUTO: 36.2 % — SIGNIFICANT CHANGE UP (ref 13–44)
MCHC RBC-ENTMCNC: 30.5 PG — SIGNIFICANT CHANGE UP (ref 27–34)
MCHC RBC-ENTMCNC: 32.4 G/DL — SIGNIFICANT CHANGE UP (ref 32–36)
MCV RBC AUTO: 94.1 FL — SIGNIFICANT CHANGE UP (ref 80–100)
MONOCYTES # BLD AUTO: 0.5 K/UL — SIGNIFICANT CHANGE UP (ref 0–0.9)
MONOCYTES NFR BLD AUTO: 10.6 % — SIGNIFICANT CHANGE UP (ref 2–14)
NEUTROPHILS # BLD AUTO: 2.42 K/UL — SIGNIFICANT CHANGE UP (ref 1.8–7.4)
NEUTROPHILS NFR BLD AUTO: 51.4 % — SIGNIFICANT CHANGE UP (ref 43–77)
NRBC # BLD: 0 /100 WBCS — SIGNIFICANT CHANGE UP (ref 0–0)
PLATELET # BLD AUTO: 227 K/UL — SIGNIFICANT CHANGE UP (ref 150–400)
POTASSIUM SERPL-MCNC: 3.9 MMOL/L — SIGNIFICANT CHANGE UP (ref 3.5–5.3)
POTASSIUM SERPL-SCNC: 3.9 MMOL/L — SIGNIFICANT CHANGE UP (ref 3.5–5.3)
PROT SERPL-MCNC: 7.4 GM/DL — SIGNIFICANT CHANGE UP (ref 6–8.3)
RBC # BLD: 4.56 M/UL — SIGNIFICANT CHANGE UP (ref 4.2–5.8)
RBC # FLD: 13.4 % — SIGNIFICANT CHANGE UP (ref 10.3–14.5)
SODIUM SERPL-SCNC: 139 MMOL/L — SIGNIFICANT CHANGE UP (ref 135–145)
WBC # BLD: 4.72 K/UL — SIGNIFICANT CHANGE UP (ref 3.8–10.5)
WBC # FLD AUTO: 4.72 K/UL — SIGNIFICANT CHANGE UP (ref 3.8–10.5)

## 2024-04-14 PROCEDURE — 71046 X-RAY EXAM CHEST 2 VIEWS: CPT | Mod: 26

## 2024-04-14 PROCEDURE — 99284 EMERGENCY DEPT VISIT MOD MDM: CPT

## 2024-04-14 RX ORDER — CYCLOBENZAPRINE HYDROCHLORIDE 10 MG/1
1 TABLET, FILM COATED ORAL
Qty: 20 | Refills: 0
Start: 2024-04-14

## 2024-04-14 RX ORDER — IBUPROFEN 200 MG
600 TABLET ORAL ONCE
Refills: 0 | Status: COMPLETED | OUTPATIENT
Start: 2024-04-14 | End: 2024-04-14

## 2024-04-14 RX ORDER — CYCLOBENZAPRINE HYDROCHLORIDE 10 MG/1
10 TABLET, FILM COATED ORAL ONCE
Refills: 0 | Status: COMPLETED | OUTPATIENT
Start: 2024-04-14 | End: 2024-04-14

## 2024-04-14 RX ORDER — METHOCARBAMOL 500 MG/1
1000 TABLET, FILM COATED ORAL ONCE
Refills: 0 | Status: COMPLETED | OUTPATIENT
Start: 2024-04-14 | End: 2024-04-14

## 2024-04-14 RX ORDER — ACETAMINOPHEN 500 MG
975 TABLET ORAL ONCE
Refills: 0 | Status: COMPLETED | OUTPATIENT
Start: 2024-04-14 | End: 2024-04-14

## 2024-04-14 RX ADMIN — METHOCARBAMOL 1000 MILLIGRAM(S): 500 TABLET, FILM COATED ORAL at 10:13

## 2024-04-14 RX ADMIN — Medication 975 MILLIGRAM(S): at 11:15

## 2024-04-14 RX ADMIN — CYCLOBENZAPRINE HYDROCHLORIDE 10 MILLIGRAM(S): 10 TABLET, FILM COATED ORAL at 11:15

## 2024-04-14 RX ADMIN — Medication 600 MILLIGRAM(S): at 11:15

## 2024-04-14 NOTE — ED PROVIDER NOTE - NSFOLLOWUPINSTRUCTIONS_ED_ALL_ED_FT
You were evaluated in the Emergency Department for muscle cramps.  You were evaluated and examined by a physician, and you had lab tests and chest x-ray (to evaluate your cough).      Based on your evaluation:  There are no signs of emergency conditions requiring admission to the hospital on today's workup.  Based on the evaluation, a presumptive diagnosis was made, however, further evaluation may be required by your primary care physician or a specialist for a more definitive diagnosis.  Therefore, please follow-up as directed or return to the Emergency Department if your symptoms change or worsen.    We recommend that you:  1. See your primary care physician within the next 72 hours for follow up.  Bring a copy of your discharge paperwork (including any test results) to your doctor. Talk to your primary doctor about your medications, as some of these can cause muscle cramping.  2. Drink plenty of fluids and eat a low-carbohydrate diet.  3. Take ibuprofen 400mg every 6 hours as needed for pain.   4. Take Flexeril 1 tablet up to every 8 hours as needed for spasms/cramps; do not drive while taking this medicine.      *** Return immediately if you have any other new/concerning symptoms. ***

## 2024-04-14 NOTE — ED PROVIDER NOTE - CLINICAL SUMMARY MEDICAL DECISION MAKING FREE TEXT BOX
Attending note (Eran): 69-year-old male history of DM HTN HLD (on statin) and SHERRY on nightly CPAP presenting with leg cramps in bilateral thighs past 3 to 4 days.  Patient also indicating cough for past 3 months nonproductive.  Afebrile well-appearing nonhypoxic lungs clear to auscultation bilaterally.  No obvious muscle swelling gross deformities overlying rashes or skin changes on bilateral lower extremities and bilateral lower extremities are neurovascular intact with soft compartments throughout.  Patient is ambulatory and has no gross deficits in lower extremities.  Overall likely muscle cramps is on a statin and this may be part of the problem.  Also seems to have some poor dietary compliance and does endorse eating ice cream last night.  Labs were obtained CBC shows no leukocytosis or anemia seen BMP shows no evidence of electrolyte abnormalities or renal/liver dysfunction.  Additionally CPK was sent not significantly elevated not concerning for significant muscle breakdown or rhabdo.  Patient given medications remained stable discussed with patient results recommend follow-up with his PCP for muscle cramps to evaluate if this is related to his statin or attempt to change in medication and also recommending intermittent use of muscle relaxant when not driving and increase hydration.  Also recommending dietary adjustments/low-carb diet.  Chest x-ray also obtained given review of systems noting cough for 3 months no evidence of consolidation effusion or pneumothorax.  Stable for discharge.

## 2024-04-14 NOTE — ED PROVIDER NOTE - PHYSICAL EXAMINATION
On Physical Exam:  General: well appearing, in NAD, speaking clearly in full sentences and without difficulty; cooperative with exam  HEENT: anicteric sclera, airway patent  Neck: no JVD  Cardiac: normal s1, s2; RRR; no MGR  Lungs: CTABL  Abdomen: soft nontender/nondistended  : no bladder tenderness or distension  Skin: intact, no rash or skin changes on b/l thighs  Extremities: no peripheral edema, no gross deformities; b/l pulses in feet and sensation/FROM of hips/knees/ankles bilateral; no calf swelling or tenderness

## 2024-04-14 NOTE — ED PROVIDER NOTE - NS ED ROS FT
Review of Systems:  -General: no fever   -ENT: no congestion, no difficulty swallowing  -Pulmonary: +cough (cough x 3 months, nonproductive, unchanged), no shortness of breath  -Cardiac: no chest pain  -Gastrointestinal: no abdominal pain, no nausea, no vomiting, and no diarrhea.  -Genitourinary: no blood or pain with urination  -Musculoskeletal: no back or neck pain; + b/l LE cramping in thighs  -Skin: no rashes  -Endocrine: +h/o diabetes  -Neurologic: No new weakness or numbness in extremities    All else negative unless otherwise specified elsewhere in this note.

## 2024-04-14 NOTE — ED PROVIDER NOTE - PROGRESS NOTE DETAILS
Attending note (Eran): Patient remains hemodynamically stable well-appearing ambulatory.  No acute actionable findings on labs.  Possible this is related to medications; recommending he discuss his medications, especially statins, as this may be causing symptoms. Stable for dc.

## 2024-04-14 NOTE — ED ADULT NURSE NOTE - OBJECTIVE STATEMENT
69 yr old male with PMH of diabetes, high cholesterol, and hypertension, stents placed in heart in December 2021 and 2023. Patient c/o leg cramps and pain  bilaterally starting this morning.

## 2024-04-14 NOTE — ED PROVIDER NOTE - OBJECTIVE STATEMENT
Attending note (Eran): 69-year-old male history of DM HTN HLD (on statin) and SHERRY on nightly CPAP presenting with leg cramps in bilateral thighs past 3 to 4 days.  No falls or trauma.  Has had intermittent cramping in legs in past.  Denies any low back pain or pain radiating down the leg.  No fecal or urinary incontinence.  Is not on anticoagulation.

## 2024-04-14 NOTE — ED ADULT NURSE NOTE - NSFALLUNIVINTERV_ED_ALL_ED
Bed/Stretcher in lowest position, wheels locked, appropriate side rails in place/Call bell, personal items and telephone in reach/Instruct patient to call for assistance before getting out of bed/chair/stretcher/Non-slip footwear applied when patient is off stretcher/Hood to call system/Physically safe environment - no spills, clutter or unnecessary equipment/Purposeful proactive rounding/Room/bathroom lighting operational, light cord in reach

## 2024-04-14 NOTE — ED PROVIDER NOTE - PATIENT PORTAL LINK FT
You can access the FollowMyHealth Patient Portal offered by [facility] by registering at the following website: [email or web address]. By joining [facility]’s FollowMyHealth portal, you will also be able to view your health information using other applications (apps) compatible with our system.

## 2024-08-30 DIAGNOSIS — K21.9 GASTRO-ESOPHAGEAL REFLUX DISEASE W/OUT ESOPHAGITIS: ICD-10-CM

## 2024-08-30 RX ORDER — OMEPRAZOLE 40 MG/1
40 CAPSULE, DELAYED RELEASE ORAL
Qty: 1 | Refills: 3 | Status: ACTIVE | COMMUNITY
Start: 2024-08-30 | End: 1900-01-01

## 2024-09-16 ENCOUNTER — APPOINTMENT (OUTPATIENT)
Dept: OPHTHALMOLOGY | Facility: CLINIC | Age: 69
End: 2024-09-16

## 2024-09-17 ENCOUNTER — APPOINTMENT (OUTPATIENT)
Dept: GASTROENTEROLOGY | Facility: CLINIC | Age: 69
End: 2024-09-17
Payer: COMMERCIAL

## 2024-09-17 VITALS
DIASTOLIC BLOOD PRESSURE: 80 MMHG | HEIGHT: 69 IN | WEIGHT: 250 LBS | HEART RATE: 55 BPM | BODY MASS INDEX: 37.03 KG/M2 | SYSTOLIC BLOOD PRESSURE: 130 MMHG | TEMPERATURE: 98.3 F | OXYGEN SATURATION: 98 %

## 2024-09-17 DIAGNOSIS — K21.9 GASTRO-ESOPHAGEAL REFLUX DISEASE W/OUT ESOPHAGITIS: ICD-10-CM

## 2024-09-17 PROCEDURE — 99214 OFFICE O/P EST MOD 30 MIN: CPT

## 2024-09-17 RX ORDER — TICAGRELOR 90 MG/1
90 TABLET ORAL
Refills: 0 | Status: ACTIVE | COMMUNITY

## 2024-09-17 NOTE — ASSESSMENT
[FreeTextEntry1] : Impression: Exacerbation of already established GERD with negative EGD approximately 6 years ago.  Symptoms promptly resolved on current medication.  No indication for repeat EGD at this time.  Plan: Complete 8-week course of omeprazole 40 mg and then use on demand.  Consider repeating EGD at next interval surveillance colonoscopy

## 2024-09-17 NOTE — HISTORY OF PRESENT ILLNESS
[FreeTextEntry1] : Patient began experiencing heartburn and some epigastric bloating about a month ago.  Started on omeprazole 40 mg and symptoms promptly resolved.  Still taking.  Has a history of GERD.  Had an EGD together with his colonoscopy prior to the one I did last year.  No significant findings.

## 2024-09-19 ENCOUNTER — APPOINTMENT (OUTPATIENT)
Dept: VASCULAR SURGERY | Facility: CLINIC | Age: 69
End: 2024-09-19
Payer: COMMERCIAL

## 2024-09-19 VITALS — SYSTOLIC BLOOD PRESSURE: 141 MMHG | HEART RATE: 88 BPM | DIASTOLIC BLOOD PRESSURE: 88 MMHG

## 2024-09-19 VITALS
HEART RATE: 87 BPM | SYSTOLIC BLOOD PRESSURE: 157 MMHG | DIASTOLIC BLOOD PRESSURE: 86 MMHG | BODY MASS INDEX: 37.03 KG/M2 | TEMPERATURE: 98.6 F | WEIGHT: 250 LBS | HEIGHT: 69 IN

## 2024-09-19 DIAGNOSIS — I83.893 VARICOSE VEINS OF BILATERAL LOWER EXTREMITIES WITH OTHER COMPLICATIONS: ICD-10-CM

## 2024-09-19 PROCEDURE — 99204 OFFICE O/P NEW MOD 45 MIN: CPT

## 2024-09-19 PROCEDURE — 93970 EXTREMITY STUDY: CPT

## 2024-09-19 RX ORDER — SOLIFENACIN SUCCINATE 10 MG/1
TABLET ORAL
Refills: 0 | Status: ACTIVE | COMMUNITY

## 2024-11-04 RX ORDER — ALPRAZOLAM 0.5 MG/1
0.5 TABLET ORAL
Qty: 1 | Refills: 0 | Status: COMPLETED | COMMUNITY
Start: 2024-11-04 | End: 2024-11-08

## 2024-11-04 RX ORDER — LIDOCAINE HYDROCHLORIDE 10 MG/ML
1 INJECTION, SOLUTION INFILTRATION; PERINEURAL
Qty: 50 | Refills: 0 | Status: COMPLETED | COMMUNITY
Start: 2024-11-04 | End: 2024-11-08

## 2024-11-08 ENCOUNTER — APPOINTMENT (OUTPATIENT)
Dept: VASCULAR SURGERY | Facility: CLINIC | Age: 69
End: 2024-11-08

## 2024-11-08 DIAGNOSIS — I83.893 VARICOSE VEINS OF BILATERAL LOWER EXTREMITIES WITH OTHER COMPLICATIONS: ICD-10-CM

## 2024-11-08 PROCEDURE — 36475 ENDOVENOUS RF 1ST VEIN: CPT | Mod: LT

## 2024-11-15 ENCOUNTER — APPOINTMENT (OUTPATIENT)
Dept: VASCULAR SURGERY | Facility: CLINIC | Age: 69
End: 2024-11-15
Payer: COMMERCIAL

## 2024-11-15 PROCEDURE — 93971 EXTREMITY STUDY: CPT | Mod: LT

## 2024-12-06 ENCOUNTER — APPOINTMENT (OUTPATIENT)
Dept: VASCULAR SURGERY | Facility: CLINIC | Age: 69
End: 2024-12-06

## 2024-12-18 ENCOUNTER — NON-APPOINTMENT (OUTPATIENT)
Age: 69
End: 2024-12-18

## 2024-12-20 ENCOUNTER — APPOINTMENT (OUTPATIENT)
Dept: OPHTHALMOLOGY | Facility: CLINIC | Age: 69
End: 2024-12-20
Payer: COMMERCIAL

## 2024-12-20 ENCOUNTER — NON-APPOINTMENT (OUTPATIENT)
Age: 69
End: 2024-12-20

## 2024-12-20 PROCEDURE — 92014 COMPRE OPH EXAM EST PT 1/>: CPT

## 2024-12-20 PROCEDURE — 92025 CPTRIZED CORNEAL TOPOGRAPHY: CPT

## 2024-12-20 PROCEDURE — 92004 COMPRE OPH EXAM NEW PT 1/>: CPT

## 2024-12-20 PROCEDURE — 92136 OPHTHALMIC BIOMETRY: CPT | Mod: LT

## 2025-04-08 ENCOUNTER — APPOINTMENT (OUTPATIENT)
Dept: OPHTHALMOLOGY | Facility: AMBULATORY SURGERY CENTER | Age: 70
End: 2025-04-08

## 2025-04-09 ENCOUNTER — APPOINTMENT (OUTPATIENT)
Dept: OPHTHALMOLOGY | Facility: CLINIC | Age: 70
End: 2025-04-09

## 2025-04-16 ENCOUNTER — APPOINTMENT (OUTPATIENT)
Dept: OPHTHALMOLOGY | Facility: CLINIC | Age: 70
End: 2025-04-16

## 2025-05-09 ENCOUNTER — APPOINTMENT (OUTPATIENT)
Dept: OPHTHALMOLOGY | Facility: CLINIC | Age: 70
End: 2025-05-09

## 2025-06-06 NOTE — PATIENT PROFILE ADULT - HAVE YOU HAD A FIRST COVID-19 BOOSTER?
Pt's last Prolia injection was on 12/23/24. Pt will be due for next injection on or after 6/23/25.     Pt has an upcoming appt on 6/30/25 for Prolia Injection with MD  --  Submited Prolia IV via [employer] portal  Awaiting SOB, 3-5 business days       Yes